# Patient Record
Sex: FEMALE | Race: WHITE | NOT HISPANIC OR LATINO | Employment: FULL TIME | URBAN - METROPOLITAN AREA
[De-identification: names, ages, dates, MRNs, and addresses within clinical notes are randomized per-mention and may not be internally consistent; named-entity substitution may affect disease eponyms.]

---

## 2021-04-21 ENCOUNTER — TRANSCRIBE ORDERS (OUTPATIENT)
Dept: LAB | Facility: HOSPITAL | Age: 31
End: 2021-04-21

## 2021-04-21 ENCOUNTER — LAB (OUTPATIENT)
Dept: LAB | Facility: HOSPITAL | Age: 31
End: 2021-04-21

## 2021-04-21 DIAGNOSIS — O02.1 MISSED ABORTION: Primary | ICD-10-CM

## 2021-04-21 DIAGNOSIS — O02.1 MISSED ABORTION: ICD-10-CM

## 2021-04-21 LAB
ABO GROUP BLD: NORMAL
BASOPHILS # BLD AUTO: 0.03 10*3/MM3 (ref 0–0.2)
BASOPHILS NFR BLD AUTO: 0.5 % (ref 0–1.5)
DEPRECATED RDW RBC AUTO: 40.9 FL (ref 37–54)
EOSINOPHIL # BLD AUTO: 0.01 10*3/MM3 (ref 0–0.4)
EOSINOPHIL NFR BLD AUTO: 0.2 % (ref 0.3–6.2)
ERYTHROCYTE [DISTWIDTH] IN BLOOD BY AUTOMATED COUNT: 12.6 % (ref 12.3–15.4)
HCT VFR BLD AUTO: 38.2 % (ref 34–46.6)
HGB BLD-MCNC: 12.7 G/DL (ref 12–15.9)
IMM GRANULOCYTES # BLD AUTO: 0.02 10*3/MM3 (ref 0–0.05)
IMM GRANULOCYTES NFR BLD AUTO: 0.3 % (ref 0–0.5)
LYMPHOCYTES # BLD AUTO: 1.61 10*3/MM3 (ref 0.7–3.1)
LYMPHOCYTES NFR BLD AUTO: 24.6 % (ref 19.6–45.3)
MCH RBC QN AUTO: 30.1 PG (ref 26.6–33)
MCHC RBC AUTO-ENTMCNC: 33.2 G/DL (ref 31.5–35.7)
MCV RBC AUTO: 90.5 FL (ref 79–97)
MONOCYTES # BLD AUTO: 0.36 10*3/MM3 (ref 0.1–0.9)
MONOCYTES NFR BLD AUTO: 5.5 % (ref 5–12)
NEUTROPHILS NFR BLD AUTO: 4.51 10*3/MM3 (ref 1.7–7)
NEUTROPHILS NFR BLD AUTO: 68.9 % (ref 42.7–76)
NRBC BLD AUTO-RTO: 0 /100 WBC (ref 0–0.2)
PLATELET # BLD AUTO: 328 10*3/MM3 (ref 140–450)
PMV BLD AUTO: 9.4 FL (ref 6–12)
RBC # BLD AUTO: 4.22 10*6/MM3 (ref 3.77–5.28)
RH BLD: POSITIVE
WBC # BLD AUTO: 6.54 10*3/MM3 (ref 3.4–10.8)

## 2021-04-21 PROCEDURE — 36415 COLL VENOUS BLD VENIPUNCTURE: CPT

## 2021-04-21 PROCEDURE — 86901 BLOOD TYPING SEROLOGIC RH(D): CPT

## 2021-04-21 PROCEDURE — 85025 COMPLETE CBC W/AUTO DIFF WBC: CPT

## 2021-04-21 PROCEDURE — 86900 BLOOD TYPING SEROLOGIC ABO: CPT

## 2021-04-23 ENCOUNTER — APPOINTMENT (OUTPATIENT)
Dept: PREADMISSION TESTING | Facility: HOSPITAL | Age: 31
End: 2021-04-23

## 2021-04-23 PROCEDURE — C9803 HOPD COVID-19 SPEC COLLECT: HCPCS

## 2021-04-23 PROCEDURE — U0004 COV-19 TEST NON-CDC HGH THRU: HCPCS

## 2021-04-24 LAB — SARS-COV-2 RNA NOSE QL NAA+PROBE: NOT DETECTED

## 2021-04-26 ENCOUNTER — LAB REQUISITION (OUTPATIENT)
Dept: LAB | Facility: HOSPITAL | Age: 31
End: 2021-04-26

## 2021-04-26 DIAGNOSIS — O02.1 MISSED ABORTION: ICD-10-CM

## 2021-04-26 PROCEDURE — 88305 TISSUE EXAM BY PATHOLOGIST: CPT | Performed by: OBSTETRICS & GYNECOLOGY

## 2021-04-27 LAB
CYTO UR: NORMAL
LAB AP CASE REPORT: NORMAL
LAB AP CLINICAL INFORMATION: NORMAL
PATH REPORT.FINAL DX SPEC: NORMAL
PATH REPORT.GROSS SPEC: NORMAL

## 2022-02-12 ENCOUNTER — TRANSCRIBE ORDERS (OUTPATIENT)
Dept: LAB | Facility: HOSPITAL | Age: 32
End: 2022-02-12

## 2022-02-12 ENCOUNTER — LAB (OUTPATIENT)
Dept: LAB | Facility: HOSPITAL | Age: 32
End: 2022-02-12

## 2022-02-12 DIAGNOSIS — N92.6 IRREGULAR MENSTRUAL CYCLE: Primary | ICD-10-CM

## 2022-02-12 DIAGNOSIS — N92.6 IRREGULAR MENSTRUAL CYCLE: ICD-10-CM

## 2022-02-12 LAB — HCG INTACT+B SERPL-ACNC: 1349 MIU/ML

## 2022-02-12 PROCEDURE — 36415 COLL VENOUS BLD VENIPUNCTURE: CPT

## 2022-02-12 PROCEDURE — 84702 CHORIONIC GONADOTROPIN TEST: CPT

## 2022-04-04 LAB
EXTERNAL HEPATITIS B SURFACE ANTIGEN: NEGATIVE
EXTERNAL HEPATITIS C AB: NORMAL
EXTERNAL RUBELLA QUALITATIVE: NORMAL
HIV1 P24 AG SERPL QL IA: NEGATIVE

## 2022-07-27 LAB
EXTERNAL GTT 1 HOUR: 158
EXTERNAL URINE DRUG SCREEN: NORMAL

## 2022-08-03 LAB — EXTERNAL GTT 3 HOURS: NORMAL

## 2022-09-14 LAB — EXTERNAL GROUP B STREP ANTIGEN: NORMAL

## 2022-10-23 ENCOUNTER — PREP FOR SURGERY (OUTPATIENT)
Dept: OTHER | Facility: HOSPITAL | Age: 32
End: 2022-10-23

## 2022-10-23 DIAGNOSIS — O48.0 POST TERM PREGNANCY AT 41 WEEKS GESTATION: Primary | ICD-10-CM

## 2022-10-23 DIAGNOSIS — Z3A.41 POST TERM PREGNANCY AT 41 WEEKS GESTATION: Primary | ICD-10-CM

## 2022-10-23 RX ORDER — SODIUM CHLORIDE, SODIUM LACTATE, POTASSIUM CHLORIDE, CALCIUM CHLORIDE 600; 310; 30; 20 MG/100ML; MG/100ML; MG/100ML; MG/100ML
125 INJECTION, SOLUTION INTRAVENOUS CONTINUOUS
Status: CANCELLED | OUTPATIENT
Start: 2022-10-23

## 2022-10-23 RX ORDER — ACETAMINOPHEN 325 MG/1
650 TABLET ORAL EVERY 4 HOURS PRN
Status: CANCELLED | OUTPATIENT
Start: 2022-10-23

## 2022-10-23 RX ORDER — TERBUTALINE SULFATE 1 MG/ML
0.25 INJECTION, SOLUTION SUBCUTANEOUS AS NEEDED
Status: CANCELLED | OUTPATIENT
Start: 2022-10-23

## 2022-10-23 RX ORDER — IBUPROFEN 600 MG/1
600 TABLET ORAL EVERY 6 HOURS PRN
Status: CANCELLED | OUTPATIENT
Start: 2022-10-23

## 2022-10-23 RX ORDER — PROMETHAZINE HYDROCHLORIDE 12.5 MG/1
12.5 TABLET ORAL EVERY 6 HOURS PRN
Status: CANCELLED | OUTPATIENT
Start: 2022-10-23

## 2022-10-23 RX ORDER — CARBOPROST TROMETHAMINE 250 UG/ML
250 INJECTION, SOLUTION INTRAMUSCULAR AS NEEDED
Status: CANCELLED | OUTPATIENT
Start: 2022-10-23

## 2022-10-23 RX ORDER — HYDROXYZINE HYDROCHLORIDE 25 MG/1
50 TABLET, FILM COATED ORAL NIGHTLY PRN
Status: CANCELLED | OUTPATIENT
Start: 2022-10-23

## 2022-10-23 RX ORDER — SODIUM CHLORIDE 0.9 % (FLUSH) 0.9 %
10 SYRINGE (ML) INJECTION AS NEEDED
Status: CANCELLED | OUTPATIENT
Start: 2022-10-23

## 2022-10-23 RX ORDER — BUTORPHANOL TARTRATE 1 MG/ML
1 INJECTION, SOLUTION INTRAMUSCULAR; INTRAVENOUS
Status: CANCELLED | OUTPATIENT
Start: 2022-10-23

## 2022-10-23 RX ORDER — LIDOCAINE HYDROCHLORIDE 10 MG/ML
5 INJECTION, SOLUTION EPIDURAL; INFILTRATION; INTRACAUDAL; PERINEURAL AS NEEDED
Status: CANCELLED | OUTPATIENT
Start: 2022-10-23

## 2022-10-23 RX ORDER — METHYLERGONOVINE MALEATE 0.2 MG/ML
200 INJECTION INTRAVENOUS ONCE AS NEEDED
Status: CANCELLED | OUTPATIENT
Start: 2022-10-23

## 2022-10-23 RX ORDER — ONDANSETRON 2 MG/ML
4 INJECTION INTRAMUSCULAR; INTRAVENOUS EVERY 6 HOURS PRN
Status: CANCELLED | OUTPATIENT
Start: 2022-10-23

## 2022-10-23 RX ORDER — SODIUM CHLORIDE 0.9 % (FLUSH) 0.9 %
10 SYRINGE (ML) INJECTION EVERY 12 HOURS SCHEDULED
Status: CANCELLED | OUTPATIENT
Start: 2022-10-23

## 2022-10-23 RX ORDER — PROMETHAZINE HYDROCHLORIDE 12.5 MG/1
12.5 SUPPOSITORY RECTAL EVERY 6 HOURS PRN
Status: CANCELLED | OUTPATIENT
Start: 2022-10-23

## 2022-10-23 RX ORDER — MISOPROSTOL 200 UG/1
800 TABLET ORAL AS NEEDED
Status: CANCELLED | OUTPATIENT
Start: 2022-10-23

## 2022-10-23 RX ORDER — MAGNESIUM CARB/ALUMINUM HYDROX 105-160MG
30 TABLET,CHEWABLE ORAL ONCE
Status: CANCELLED | OUTPATIENT
Start: 2022-10-23 | End: 2022-10-23

## 2022-10-23 RX ORDER — ONDANSETRON 4 MG/1
4 TABLET, FILM COATED ORAL EVERY 6 HOURS PRN
Status: CANCELLED | OUTPATIENT
Start: 2022-10-23

## 2022-10-23 RX ORDER — OXYTOCIN 10 [USP'U]/ML
10 INJECTION, SOLUTION INTRAMUSCULAR; INTRAVENOUS ONCE
Status: CANCELLED | OUTPATIENT
Start: 2022-10-23 | End: 2022-10-23

## 2022-10-24 ENCOUNTER — ANESTHESIA EVENT (OUTPATIENT)
Dept: LABOR AND DELIVERY | Facility: HOSPITAL | Age: 32
End: 2022-10-24

## 2022-10-24 ENCOUNTER — ANESTHESIA (OUTPATIENT)
Dept: LABOR AND DELIVERY | Facility: HOSPITAL | Age: 32
End: 2022-10-24

## 2022-10-24 ENCOUNTER — HOSPITAL ENCOUNTER (INPATIENT)
Facility: HOSPITAL | Age: 32
LOS: 3 days | Discharge: HOME | End: 2022-10-27
Attending: OBSTETRICS & GYNECOLOGY | Admitting: OBSTETRICS & GYNECOLOGY

## 2022-10-24 ENCOUNTER — HOSPITAL ENCOUNTER (OUTPATIENT)
Dept: LABOR AND DELIVERY | Facility: HOSPITAL | Age: 32
Discharge: HOME | End: 2022-10-24

## 2022-10-24 DIAGNOSIS — O48.0 POST TERM PREGNANCY AT 41 WEEKS GESTATION: ICD-10-CM

## 2022-10-24 DIAGNOSIS — Z3A.41 POST TERM PREGNANCY AT 41 WEEKS GESTATION: ICD-10-CM

## 2022-10-24 LAB
ABO GROUP BLD: NORMAL
ALP SERPL-CCNC: 119 U/L (ref 39–117)
ALT SERPL W P-5'-P-CCNC: 9 U/L (ref 1–33)
AST SERPL-CCNC: 29 U/L (ref 1–32)
BILIRUB SERPL-MCNC: <0.2 MG/DL (ref 0–1.2)
BLD GP AB SCN SERPL QL: NEGATIVE
CREAT SERPL-MCNC: 0.7 MG/DL (ref 0.57–1)
DEPRECATED RDW RBC AUTO: 46.5 FL (ref 37–54)
ERYTHROCYTE [DISTWIDTH] IN BLOOD BY AUTOMATED COUNT: 14.6 % (ref 12.3–15.4)
HCT VFR BLD AUTO: 34.2 % (ref 34–46.6)
HGB BLD-MCNC: 12 G/DL (ref 12–15.9)
LDH SERPL-CCNC: 267 U/L (ref 135–214)
MCH RBC QN AUTO: 30.5 PG (ref 26.6–33)
MCHC RBC AUTO-ENTMCNC: 35.1 G/DL (ref 31.5–35.7)
MCV RBC AUTO: 87 FL (ref 79–97)
PLATELET # BLD AUTO: 254 10*3/MM3 (ref 140–450)
PMV BLD AUTO: 10.8 FL (ref 6–12)
RBC # BLD AUTO: 3.93 10*6/MM3 (ref 3.77–5.28)
RH BLD: POSITIVE
T&S EXPIRATION DATE: NORMAL
URATE SERPL-MCNC: 5.9 MG/DL (ref 2.4–5.7)
WBC NRBC COR # BLD: 10.21 10*3/MM3 (ref 3.4–10.8)

## 2022-10-24 PROCEDURE — 3E0P7VZ INTRODUCTION OF HORMONE INTO FEMALE REPRODUCTIVE, VIA NATURAL OR ARTIFICIAL OPENING: ICD-10-PCS | Performed by: ADVANCED PRACTICE MIDWIFE

## 2022-10-24 PROCEDURE — C1755 CATHETER, INTRASPINAL: HCPCS

## 2022-10-24 PROCEDURE — 86850 RBC ANTIBODY SCREEN: CPT | Performed by: ADVANCED PRACTICE MIDWIFE

## 2022-10-24 PROCEDURE — 25010000002 FENTANYL CITRATE (PF) 50 MCG/ML SOLUTION: Performed by: NURSE ANESTHETIST, CERTIFIED REGISTERED

## 2022-10-24 PROCEDURE — 36415 COLL VENOUS BLD VENIPUNCTURE: CPT | Performed by: ADVANCED PRACTICE MIDWIFE

## 2022-10-24 PROCEDURE — 84550 ASSAY OF BLOOD/URIC ACID: CPT | Performed by: ADVANCED PRACTICE MIDWIFE

## 2022-10-24 PROCEDURE — 59025 FETAL NON-STRESS TEST: CPT

## 2022-10-24 PROCEDURE — 25010000002 ROPIVACAINE PER 1 MG: Performed by: NURSE ANESTHETIST, CERTIFIED REGISTERED

## 2022-10-24 PROCEDURE — C1755 CATHETER, INTRASPINAL: HCPCS | Performed by: ANESTHESIOLOGY

## 2022-10-24 PROCEDURE — 86900 BLOOD TYPING SEROLOGIC ABO: CPT | Performed by: ADVANCED PRACTICE MIDWIFE

## 2022-10-24 PROCEDURE — 84450 TRANSFERASE (AST) (SGOT): CPT | Performed by: ADVANCED PRACTICE MIDWIFE

## 2022-10-24 PROCEDURE — 86901 BLOOD TYPING SEROLOGIC RH(D): CPT | Performed by: ADVANCED PRACTICE MIDWIFE

## 2022-10-24 PROCEDURE — 84075 ASSAY ALKALINE PHOSPHATASE: CPT | Performed by: ADVANCED PRACTICE MIDWIFE

## 2022-10-24 PROCEDURE — 83615 LACTATE (LD) (LDH) ENZYME: CPT | Performed by: ADVANCED PRACTICE MIDWIFE

## 2022-10-24 PROCEDURE — 85027 COMPLETE CBC AUTOMATED: CPT | Performed by: ADVANCED PRACTICE MIDWIFE

## 2022-10-24 PROCEDURE — 84460 ALANINE AMINO (ALT) (SGPT): CPT | Performed by: ADVANCED PRACTICE MIDWIFE

## 2022-10-24 PROCEDURE — 82565 ASSAY OF CREATININE: CPT | Performed by: ADVANCED PRACTICE MIDWIFE

## 2022-10-24 PROCEDURE — 51703 INSERT BLADDER CATH COMPLEX: CPT

## 2022-10-24 PROCEDURE — 82247 BILIRUBIN TOTAL: CPT | Performed by: ADVANCED PRACTICE MIDWIFE

## 2022-10-24 RX ORDER — ACETAMINOPHEN 325 MG/1
650 TABLET ORAL EVERY 4 HOURS PRN
Status: DISCONTINUED | OUTPATIENT
Start: 2022-10-24 | End: 2022-10-25 | Stop reason: HOSPADM

## 2022-10-24 RX ORDER — BUTORPHANOL TARTRATE 1 MG/ML
1 INJECTION, SOLUTION INTRAMUSCULAR; INTRAVENOUS
Status: DISCONTINUED | OUTPATIENT
Start: 2022-10-24 | End: 2022-10-25

## 2022-10-24 RX ORDER — SODIUM CHLORIDE 0.9 % (FLUSH) 0.9 %
10 SYRINGE (ML) INJECTION AS NEEDED
Status: DISCONTINUED | OUTPATIENT
Start: 2022-10-24 | End: 2022-10-25 | Stop reason: HOSPADM

## 2022-10-24 RX ORDER — ESCITALOPRAM OXALATE 5 MG/1
5 TABLET ORAL DAILY
COMMUNITY

## 2022-10-24 RX ORDER — ONDANSETRON 4 MG/1
4 TABLET, FILM COATED ORAL EVERY 6 HOURS PRN
Status: DISCONTINUED | OUTPATIENT
Start: 2022-10-24 | End: 2022-10-25 | Stop reason: HOSPADM

## 2022-10-24 RX ORDER — ASPIRIN 81 MG/1
81 TABLET, CHEWABLE ORAL DAILY
COMMUNITY
End: 2022-10-27 | Stop reason: HOSPADM

## 2022-10-24 RX ORDER — TERBUTALINE SULFATE 1 MG/ML
0.25 INJECTION, SOLUTION SUBCUTANEOUS AS NEEDED
Status: DISCONTINUED | OUTPATIENT
Start: 2022-10-24 | End: 2022-10-25

## 2022-10-24 RX ORDER — CALCIUM CARBONATE 200(500)MG
1 TABLET,CHEWABLE ORAL DAILY
COMMUNITY

## 2022-10-24 RX ORDER — OXYTOCIN 10 [USP'U]/ML
10 INJECTION, SOLUTION INTRAMUSCULAR; INTRAVENOUS ONCE
Status: COMPLETED | OUTPATIENT
Start: 2022-10-24 | End: 2022-10-25

## 2022-10-24 RX ORDER — PROMETHAZINE HYDROCHLORIDE 12.5 MG/1
12.5 SUPPOSITORY RECTAL EVERY 6 HOURS PRN
Status: DISCONTINUED | OUTPATIENT
Start: 2022-10-24 | End: 2022-10-25 | Stop reason: HOSPADM

## 2022-10-24 RX ORDER — MISOPROSTOL 100 MCG
25 TABLET ORAL ONCE
Status: COMPLETED | OUTPATIENT
Start: 2022-10-24 | End: 2022-10-24

## 2022-10-24 RX ORDER — FENTANYL CITRATE 50 UG/ML
INJECTION, SOLUTION INTRAMUSCULAR; INTRAVENOUS AS NEEDED
Status: DISCONTINUED | OUTPATIENT
Start: 2022-10-24 | End: 2022-10-25 | Stop reason: SURG

## 2022-10-24 RX ORDER — ONDANSETRON 2 MG/ML
4 INJECTION INTRAMUSCULAR; INTRAVENOUS ONCE AS NEEDED
Status: DISCONTINUED | OUTPATIENT
Start: 2022-10-24 | End: 2022-10-25 | Stop reason: HOSPADM

## 2022-10-24 RX ORDER — TRISODIUM CITRATE DIHYDRATE AND CITRIC ACID MONOHYDRATE 500; 334 MG/5ML; MG/5ML
30 SOLUTION ORAL ONCE
Status: DISCONTINUED | OUTPATIENT
Start: 2022-10-24 | End: 2022-10-25

## 2022-10-24 RX ORDER — LIDOCAINE HYDROCHLORIDE AND EPINEPHRINE 15; 5 MG/ML; UG/ML
INJECTION, SOLUTION EPIDURAL AS NEEDED
Status: DISCONTINUED | OUTPATIENT
Start: 2022-10-24 | End: 2022-10-25 | Stop reason: SURG

## 2022-10-24 RX ORDER — MAGNESIUM CARB/ALUMINUM HYDROX 105-160MG
30 TABLET,CHEWABLE ORAL ONCE
Status: DISCONTINUED | OUTPATIENT
Start: 2022-10-24 | End: 2022-10-25

## 2022-10-24 RX ORDER — DIPHENHYDRAMINE HYDROCHLORIDE 50 MG/ML
12.5 INJECTION INTRAMUSCULAR; INTRAVENOUS EVERY 8 HOURS PRN
Status: DISCONTINUED | OUTPATIENT
Start: 2022-10-24 | End: 2022-10-25 | Stop reason: HOSPADM

## 2022-10-24 RX ORDER — PROMETHAZINE HYDROCHLORIDE 12.5 MG/1
12.5 TABLET ORAL EVERY 6 HOURS PRN
Status: DISCONTINUED | OUTPATIENT
Start: 2022-10-24 | End: 2022-10-25 | Stop reason: HOSPADM

## 2022-10-24 RX ORDER — HYDROXYZINE HYDROCHLORIDE 25 MG/1
50 TABLET, FILM COATED ORAL NIGHTLY PRN
Status: DISCONTINUED | OUTPATIENT
Start: 2022-10-24 | End: 2022-10-25 | Stop reason: HOSPADM

## 2022-10-24 RX ORDER — ONDANSETRON 2 MG/ML
4 INJECTION INTRAMUSCULAR; INTRAVENOUS EVERY 6 HOURS PRN
Status: DISCONTINUED | OUTPATIENT
Start: 2022-10-24 | End: 2022-10-25 | Stop reason: HOSPADM

## 2022-10-24 RX ORDER — EPHEDRINE SULFATE 5 MG/ML
INJECTION INTRAVENOUS
Status: DISCONTINUED
Start: 2022-10-24 | End: 2022-10-25 | Stop reason: WASHOUT

## 2022-10-24 RX ORDER — EPHEDRINE SULFATE 5 MG/ML
10 INJECTION INTRAVENOUS
Status: DISCONTINUED | OUTPATIENT
Start: 2022-10-24 | End: 2022-10-25

## 2022-10-24 RX ORDER — FAMOTIDINE 10 MG/ML
20 INJECTION, SOLUTION INTRAVENOUS ONCE AS NEEDED
Status: DISCONTINUED | OUTPATIENT
Start: 2022-10-24 | End: 2022-10-25 | Stop reason: HOSPADM

## 2022-10-24 RX ORDER — SODIUM CHLORIDE, SODIUM LACTATE, POTASSIUM CHLORIDE, CALCIUM CHLORIDE 600; 310; 30; 20 MG/100ML; MG/100ML; MG/100ML; MG/100ML
125 INJECTION, SOLUTION INTRAVENOUS CONTINUOUS
Status: DISCONTINUED | OUTPATIENT
Start: 2022-10-24 | End: 2022-10-27 | Stop reason: HOSPADM

## 2022-10-24 RX ORDER — MISOPROSTOL 100 MCG
25 TABLET ORAL
Status: COMPLETED | OUTPATIENT
Start: 2022-10-24 | End: 2022-10-24

## 2022-10-24 RX ORDER — SODIUM CHLORIDE 0.9 % (FLUSH) 0.9 %
10 SYRINGE (ML) INJECTION EVERY 12 HOURS SCHEDULED
Status: DISCONTINUED | OUTPATIENT
Start: 2022-10-24 | End: 2022-10-25 | Stop reason: HOSPADM

## 2022-10-24 RX ORDER — ROPIVACAINE HYDROCHLORIDE 2 MG/ML
15 INJECTION, SOLUTION EPIDURAL; INFILTRATION; PERINEURAL CONTINUOUS
Status: DISCONTINUED | OUTPATIENT
Start: 2022-10-24 | End: 2022-10-25

## 2022-10-24 RX ORDER — LIDOCAINE HYDROCHLORIDE 10 MG/ML
5 INJECTION, SOLUTION EPIDURAL; INFILTRATION; INTRACAUDAL; PERINEURAL AS NEEDED
Status: DISCONTINUED | OUTPATIENT
Start: 2022-10-24 | End: 2022-10-25 | Stop reason: HOSPADM

## 2022-10-24 RX ADMIN — ROPIVACAINE HYDROCHLORIDE 15 ML/HR: 2 INJECTION, SOLUTION EPIDURAL; INFILTRATION at 12:10

## 2022-10-24 RX ADMIN — SODIUM CHLORIDE, POTASSIUM CHLORIDE, SODIUM LACTATE AND CALCIUM CHLORIDE 1000 ML: 600; 310; 30; 20 INJECTION, SOLUTION INTRAVENOUS at 11:49

## 2022-10-24 RX ADMIN — ACETAMINOPHEN 650 MG: 325 TABLET, FILM COATED ORAL at 16:19

## 2022-10-24 RX ADMIN — SODIUM CHLORIDE, POTASSIUM CHLORIDE, SODIUM LACTATE AND CALCIUM CHLORIDE 125 ML/HR: 600; 310; 30; 20 INJECTION, SOLUTION INTRAVENOUS at 20:54

## 2022-10-24 RX ADMIN — ROPIVACAINE HYDROCHLORIDE 15 ML/HR: 2 INJECTION, SOLUTION EPIDURAL; INFILTRATION at 21:51

## 2022-10-24 RX ADMIN — LIDOCAINE HYDROCHLORIDE AND EPINEPHRINE 3 ML: 15; 5 INJECTION, SOLUTION EPIDURAL at 12:02

## 2022-10-24 RX ADMIN — ROPIVACAINE HYDROCHLORIDE 10 ML: 5 INJECTION, SOLUTION EPIDURAL; INFILTRATION; PERINEURAL at 12:08

## 2022-10-24 RX ADMIN — MISOPROSTOL 25 MCG: 100 TABLET ORAL at 19:25

## 2022-10-24 RX ADMIN — SODIUM CHLORIDE, POTASSIUM CHLORIDE, SODIUM LACTATE AND CALCIUM CHLORIDE 125 ML/HR: 600; 310; 30; 20 INJECTION, SOLUTION INTRAVENOUS at 00:57

## 2022-10-24 RX ADMIN — SODIUM CHLORIDE, POTASSIUM CHLORIDE, SODIUM LACTATE AND CALCIUM CHLORIDE 125 ML/HR: 600; 310; 30; 20 INJECTION, SOLUTION INTRAVENOUS at 13:18

## 2022-10-24 RX ADMIN — SODIUM CHLORIDE, POTASSIUM CHLORIDE, SODIUM LACTATE AND CALCIUM CHLORIDE 125 ML/HR: 600; 310; 30; 20 INJECTION, SOLUTION INTRAVENOUS at 07:18

## 2022-10-24 RX ADMIN — FENTANYL CITRATE 100 MCG: 50 INJECTION, SOLUTION INTRAMUSCULAR; INTRAVENOUS at 12:05

## 2022-10-24 RX ADMIN — MISOPROSTOL 25 MCG: 100 TABLET ORAL at 23:47

## 2022-10-24 RX ADMIN — MISOPROSTOL 25 MCG: 100 TABLET ORAL at 14:42

## 2022-10-24 RX ADMIN — SODIUM CHLORIDE, POTASSIUM CHLORIDE, SODIUM LACTATE AND CALCIUM CHLORIDE 400 ML/HR: 600; 310; 30; 20 INJECTION, SOLUTION INTRAVENOUS at 12:10

## 2022-10-24 NOTE — ANESTHESIA PREPROCEDURE EVALUATION
Anesthesia Evaluation     Patient summary reviewed and Nursing notes reviewed   NPO Solid Status: > 6 hours  NPO Liquid Status: > 6 hours           Airway   Dental      Pulmonary - negative pulmonary ROS   Cardiovascular - negative cardio ROS        Neuro/Psych  (+) psychiatric history Anxiety and Depression,    GI/Hepatic/Renal/Endo - negative ROS     Musculoskeletal (-) negative ROS    Abdominal    Substance History - negative use     OB/GYN    (+) Pregnant,         Other                        Anesthesia Plan    ASA 2     epidural       Anesthetic plan, risks, benefits, and alternatives have been provided, discussed and informed consent has been obtained with: patient.        CODE STATUS:    Code Status (Patient has no pulse and is not breathing): CPR (Attempt to Resuscitate)  Medical Interventions (Patient has pulse or is breathing): Full

## 2022-10-24 NOTE — ANESTHESIA PROCEDURE NOTES
Labor Epidural      Patient reassessed immediately prior to procedure    Patient location during procedure: OB  Performed By  CRNA/CAA: Carmel Garduno CRNA  Preanesthetic Checklist  Completed: patient identified, IV checked, risks and benefits discussed, surgical consent, monitors and equipment checked, pre-op evaluation and timeout performed  Prep:  Pt Position:sitting  Sterile Tech:cap, gloves, mask and sterile barrier  Prep:DuraPrep  Monitoring:blood pressure monitoring  Epidural Block Procedure:  Approach:midline  Guidance:palpation technique  Location:L3-L4  Needle Type:Tuohy  Needle Gauge:17 G  Loss of Resistance Medium: saline  Loss of Resistance: 7cm  Cath Depth at skin:12 cm  Paresthesia: none  Aspiration:negative  Test Dose:negative  Number of Attempts: 1  Post Assessment:  Dressing:occlusive dressing applied and secured with tape  Pt Tolerance:patient tolerated the procedure well with no apparent complications  Complications:no

## 2022-10-25 PROBLEM — Z3A.41 41 WEEKS GESTATION OF PREGNANCY: Status: RESOLVED | Noted: 2022-10-24 | Resolved: 2022-10-25

## 2022-10-25 PROBLEM — O48.0 41 WEEKS GESTATION OF PREGNANCY: Status: RESOLVED | Noted: 2022-10-24 | Resolved: 2022-10-25

## 2022-10-25 PROCEDURE — 0DQR0ZZ REPAIR ANAL SPHINCTER, OPEN APPROACH: ICD-10-PCS | Performed by: ADVANCED PRACTICE MIDWIFE

## 2022-10-25 PROCEDURE — 59025 FETAL NON-STRESS TEST: CPT

## 2022-10-25 PROCEDURE — 25010000002 OXYTOCIN PER 10 UNITS: Performed by: ADVANCED PRACTICE MIDWIFE

## 2022-10-25 RX ORDER — IBUPROFEN 600 MG/1
600 TABLET ORAL EVERY 6 HOURS PRN
Status: DISCONTINUED | OUTPATIENT
Start: 2022-10-25 | End: 2022-10-27 | Stop reason: HOSPADM

## 2022-10-25 RX ORDER — HYDROCODONE BITARTRATE AND ACETAMINOPHEN 7.5; 325 MG/1; MG/1
1 TABLET ORAL EVERY 4 HOURS PRN
Status: DISCONTINUED | OUTPATIENT
Start: 2022-10-25 | End: 2022-10-27 | Stop reason: HOSPADM

## 2022-10-25 RX ORDER — IBUPROFEN 600 MG/1
600 TABLET ORAL EVERY 6 HOURS PRN
Status: DISCONTINUED | OUTPATIENT
Start: 2022-10-25 | End: 2022-10-25 | Stop reason: SDUPTHER

## 2022-10-25 RX ORDER — BISACODYL 10 MG
10 SUPPOSITORY, RECTAL RECTAL DAILY PRN
Status: DISCONTINUED | OUTPATIENT
Start: 2022-10-26 | End: 2022-10-27 | Stop reason: HOSPADM

## 2022-10-25 RX ORDER — HYDROCORTISONE 25 MG/G
1 CREAM TOPICAL AS NEEDED
Status: DISCONTINUED | OUTPATIENT
Start: 2022-10-25 | End: 2022-10-27 | Stop reason: HOSPADM

## 2022-10-25 RX ORDER — METHYLERGONOVINE MALEATE 0.2 MG/ML
200 INJECTION INTRAVENOUS ONCE AS NEEDED
Status: DISCONTINUED | OUTPATIENT
Start: 2022-10-25 | End: 2022-10-25 | Stop reason: HOSPADM

## 2022-10-25 RX ORDER — ONDANSETRON 2 MG/ML
4 INJECTION INTRAMUSCULAR; INTRAVENOUS EVERY 6 HOURS PRN
Status: DISCONTINUED | OUTPATIENT
Start: 2022-10-25 | End: 2022-10-27 | Stop reason: HOSPADM

## 2022-10-25 RX ORDER — MISOPROSTOL 200 UG/1
800 TABLET ORAL AS NEEDED
Status: DISCONTINUED | OUTPATIENT
Start: 2022-10-25 | End: 2022-10-25 | Stop reason: HOSPADM

## 2022-10-25 RX ORDER — DOCUSATE SODIUM 100 MG/1
100 CAPSULE, LIQUID FILLED ORAL 2 TIMES DAILY
Status: DISCONTINUED | OUTPATIENT
Start: 2022-10-25 | End: 2022-10-27 | Stop reason: HOSPADM

## 2022-10-25 RX ORDER — CARBOPROST TROMETHAMINE 250 UG/ML
250 INJECTION, SOLUTION INTRAMUSCULAR AS NEEDED
Status: DISCONTINUED | OUTPATIENT
Start: 2022-10-25 | End: 2022-10-25 | Stop reason: HOSPADM

## 2022-10-25 RX ORDER — SODIUM CHLORIDE 0.9 % (FLUSH) 0.9 %
1-10 SYRINGE (ML) INJECTION AS NEEDED
Status: DISCONTINUED | OUTPATIENT
Start: 2022-10-25 | End: 2022-10-27 | Stop reason: HOSPADM

## 2022-10-25 RX ORDER — ACETAMINOPHEN 325 MG/1
650 TABLET ORAL EVERY 4 HOURS PRN
Status: DISCONTINUED | OUTPATIENT
Start: 2022-10-25 | End: 2022-10-27 | Stop reason: HOSPADM

## 2022-10-25 RX ADMIN — IBUPROFEN 600 MG: 600 TABLET ORAL at 20:08

## 2022-10-25 RX ADMIN — IBUPROFEN 600 MG: 600 TABLET ORAL at 11:41

## 2022-10-25 RX ADMIN — OXYTOCIN 10 UNITS: 10 INJECTION INTRAVENOUS at 03:59

## 2022-10-25 RX ADMIN — IBUPROFEN 600 MG: 600 TABLET ORAL at 06:32

## 2022-10-25 RX ADMIN — DOCUSATE SODIUM 100 MG: 100 CAPSULE, LIQUID FILLED ORAL at 08:07

## 2022-10-25 RX ADMIN — ACETAMINOPHEN 650 MG: 325 TABLET, FILM COATED ORAL at 18:18

## 2022-10-25 RX ADMIN — ACETAMINOPHEN 650 MG: 325 TABLET, FILM COATED ORAL at 08:07

## 2022-10-25 RX ADMIN — ACETAMINOPHEN 650 MG: 325 TABLET, FILM COATED ORAL at 13:31

## 2022-10-25 RX ADMIN — DOCUSATE SODIUM 100 MG: 100 CAPSULE, LIQUID FILLED ORAL at 20:08

## 2022-10-25 NOTE — ANESTHESIA POSTPROCEDURE EVALUATION
Patient: Nieves Erickson    Procedure Summary     Date: 10/24/22 Room / Location:     Anesthesia Start: 1152 Anesthesia Stop: 10/25/22 0407    Procedure: LABOR ANALGESIA Diagnosis:     Scheduled Providers:  Provider: Tasha Gaytan DO    Anesthesia Type: epidural ASA Status: 2          Anesthesia Type: epidural    Vitals  Vitals Value Taken Time   /66 10/25/22 0836   Temp 98.4 °F (36.9 °C) 10/25/22 0836   Pulse 72 10/25/22 0836   Resp 18 10/25/22 0836   SpO2             Post Anesthesia Care and Evaluation    Patient location during evaluation: bedside  Patient participation: complete - patient participated  Level of consciousness: awake and alert  Pain management: adequate    Airway patency: patent  Anesthetic complications: No anesthetic complications    Cardiovascular status: acceptable  Respiratory status: acceptable  Hydration status: acceptable  Post Neuraxial Block status: Motor and sensory function returned to baseline and No signs or symptoms of PDPH

## 2022-10-26 LAB
BASOPHILS # BLD AUTO: 0.04 10*3/MM3 (ref 0–0.2)
BASOPHILS NFR BLD AUTO: 0.3 % (ref 0–1.5)
DEPRECATED RDW RBC AUTO: 50.1 FL (ref 37–54)
EOSINOPHIL # BLD AUTO: 0.09 10*3/MM3 (ref 0–0.4)
EOSINOPHIL NFR BLD AUTO: 0.8 % (ref 0.3–6.2)
ERYTHROCYTE [DISTWIDTH] IN BLOOD BY AUTOMATED COUNT: 15 % (ref 12.3–15.4)
HCT VFR BLD AUTO: 33.1 % (ref 34–46.6)
HGB BLD-MCNC: 10.8 G/DL (ref 12–15.9)
IMM GRANULOCYTES # BLD AUTO: 0.15 10*3/MM3 (ref 0–0.05)
IMM GRANULOCYTES NFR BLD AUTO: 1.3 % (ref 0–0.5)
LYMPHOCYTES # BLD AUTO: 2.1 10*3/MM3 (ref 0.7–3.1)
LYMPHOCYTES NFR BLD AUTO: 18.2 % (ref 19.6–45.3)
MCH RBC QN AUTO: 30 PG (ref 26.6–33)
MCHC RBC AUTO-ENTMCNC: 32.6 G/DL (ref 31.5–35.7)
MCV RBC AUTO: 91.9 FL (ref 79–97)
MONOCYTES # BLD AUTO: 0.57 10*3/MM3 (ref 0.1–0.9)
MONOCYTES NFR BLD AUTO: 5 % (ref 5–12)
NEUTROPHILS NFR BLD AUTO: 74.4 % (ref 42.7–76)
NEUTROPHILS NFR BLD AUTO: 8.56 10*3/MM3 (ref 1.7–7)
NRBC BLD AUTO-RTO: 0 /100 WBC (ref 0–0.2)
PLATELET # BLD AUTO: 232 10*3/MM3 (ref 140–450)
PMV BLD AUTO: 10.3 FL (ref 6–12)
RBC # BLD AUTO: 3.6 10*6/MM3 (ref 3.77–5.28)
WBC NRBC COR # BLD: 11.51 10*3/MM3 (ref 3.4–10.8)

## 2022-10-26 PROCEDURE — 85025 COMPLETE CBC W/AUTO DIFF WBC: CPT | Performed by: ADVANCED PRACTICE MIDWIFE

## 2022-10-26 RX ADMIN — IBUPROFEN 600 MG: 600 TABLET ORAL at 04:01

## 2022-10-26 RX ADMIN — DOCUSATE SODIUM 100 MG: 100 CAPSULE, LIQUID FILLED ORAL at 20:29

## 2022-10-26 RX ADMIN — IBUPROFEN 600 MG: 600 TABLET ORAL at 17:35

## 2022-10-26 RX ADMIN — DOCUSATE SODIUM 100 MG: 100 CAPSULE, LIQUID FILLED ORAL at 10:01

## 2022-10-27 VITALS
RESPIRATION RATE: 20 BRPM | TEMPERATURE: 98.4 F | HEIGHT: 63 IN | HEART RATE: 65 BPM | BODY MASS INDEX: 31.89 KG/M2 | WEIGHT: 180 LBS | DIASTOLIC BLOOD PRESSURE: 72 MMHG | SYSTOLIC BLOOD PRESSURE: 125 MMHG

## 2022-10-27 PROCEDURE — 90707 MMR VACCINE SC: CPT | Performed by: ADVANCED PRACTICE MIDWIFE

## 2022-10-27 PROCEDURE — 90471 IMMUNIZATION ADMIN: CPT | Performed by: ADVANCED PRACTICE MIDWIFE

## 2022-10-27 PROCEDURE — 25010000002 MEASLES, MUMPS & RUBELLA VAC RECONSTITUTED SOLUTION: Performed by: ADVANCED PRACTICE MIDWIFE

## 2022-10-27 RX ORDER — IBUPROFEN 600 MG/1
600 TABLET ORAL EVERY 6 HOURS PRN
Qty: 60 TABLET | Refills: 1 | Status: SHIPPED | OUTPATIENT
Start: 2022-10-27

## 2022-10-27 RX ORDER — PSEUDOEPHEDRINE HCL 30 MG
100 TABLET ORAL 2 TIMES DAILY
Qty: 60 CAPSULE | Refills: 1 | Status: SHIPPED | OUTPATIENT
Start: 2022-10-27

## 2022-10-27 RX ADMIN — MEASLES, MUMPS, AND RUBELLA VIRUS VACCINE LIVE 0.5 ML: 1000; 12500; 1000 INJECTION, POWDER, LYOPHILIZED, FOR SUSPENSION SUBCUTANEOUS at 12:21

## 2022-10-27 RX ADMIN — IBUPROFEN 600 MG: 600 TABLET ORAL at 06:15

## 2022-10-27 RX ADMIN — DOCUSATE SODIUM 100 MG: 100 CAPSULE, LIQUID FILLED ORAL at 09:12

## 2024-03-27 ENCOUNTER — TRANSCRIBE ORDERS (OUTPATIENT)
Dept: LAB | Facility: HOSPITAL | Age: 34
End: 2024-03-27
Payer: COMMERCIAL

## 2024-03-27 ENCOUNTER — LAB (OUTPATIENT)
Dept: LAB | Facility: HOSPITAL | Age: 34
End: 2024-03-27
Payer: COMMERCIAL

## 2024-03-27 DIAGNOSIS — Z32.01 PREGNANCY EXAMINATION OR TEST, POSITIVE RESULT: ICD-10-CM

## 2024-03-27 DIAGNOSIS — Z32.01 PREGNANCY EXAMINATION OR TEST, POSITIVE RESULT: Primary | ICD-10-CM

## 2024-03-27 LAB — HCG INTACT+B SERPL-ACNC: 718 MIU/ML

## 2024-03-27 PROCEDURE — 84702 CHORIONIC GONADOTROPIN TEST: CPT

## 2024-03-27 PROCEDURE — 84144 ASSAY OF PROGESTERONE: CPT

## 2024-03-27 PROCEDURE — 36415 COLL VENOUS BLD VENIPUNCTURE: CPT

## 2024-03-28 LAB — PROGEST SERPL-MCNC: 13.3 NG/ML

## 2024-03-29 ENCOUNTER — TRANSCRIBE ORDERS (OUTPATIENT)
Dept: LAB | Facility: HOSPITAL | Age: 34
End: 2024-03-29
Payer: COMMERCIAL

## 2024-03-29 ENCOUNTER — LAB (OUTPATIENT)
Dept: LAB | Facility: HOSPITAL | Age: 34
End: 2024-03-29
Payer: COMMERCIAL

## 2024-03-29 DIAGNOSIS — Z32.01 PREGNANCY EXAMINATION OR TEST, POSITIVE RESULT: Primary | ICD-10-CM

## 2024-03-29 DIAGNOSIS — Z32.01 PREGNANCY EXAMINATION OR TEST, POSITIVE RESULT: ICD-10-CM

## 2024-03-29 LAB — HCG INTACT+B SERPL-ACNC: 1786 MIU/ML

## 2024-03-29 PROCEDURE — 84702 CHORIONIC GONADOTROPIN TEST: CPT

## 2024-03-29 PROCEDURE — 36415 COLL VENOUS BLD VENIPUNCTURE: CPT

## 2024-04-30 ENCOUNTER — LAB (OUTPATIENT)
Facility: HOSPITAL | Age: 34
End: 2024-04-30
Payer: COMMERCIAL

## 2024-04-30 ENCOUNTER — TRANSCRIBE ORDERS (OUTPATIENT)
Facility: HOSPITAL | Age: 34
End: 2024-04-30
Payer: COMMERCIAL

## 2024-04-30 DIAGNOSIS — Z34.81 ENCOUNTER FOR SUPERVISION OF OTHER NORMAL PREGNANCY, FIRST TRIMESTER: Primary | ICD-10-CM

## 2024-04-30 DIAGNOSIS — Z34.81 ENCOUNTER FOR SUPERVISION OF OTHER NORMAL PREGNANCY, FIRST TRIMESTER: ICD-10-CM

## 2024-04-30 LAB
ABO GROUP BLD: NORMAL
AMPHET+METHAMPHET UR QL: NEGATIVE
AMPHETAMINES UR QL: NEGATIVE
BARBITURATES UR QL SCN: NEGATIVE
BENZODIAZ UR QL SCN: NEGATIVE
BLD GP AB SCN SERPL QL: NEGATIVE
BUPRENORPHINE SERPL-MCNC: NEGATIVE NG/ML
CANNABINOIDS SERPL QL: NEGATIVE
COCAINE UR QL: NEGATIVE
DEPRECATED RDW RBC AUTO: 43.7 FL (ref 37–54)
ERYTHROCYTE [DISTWIDTH] IN BLOOD BY AUTOMATED COUNT: 13.6 % (ref 12.3–15.4)
FENTANYL UR-MCNC: NEGATIVE NG/ML
HCT VFR BLD AUTO: 40.3 % (ref 34–46.6)
HGB BLD-MCNC: 13.2 G/DL (ref 12–15.9)
MCH RBC QN AUTO: 28.7 PG (ref 26.6–33)
MCHC RBC AUTO-ENTMCNC: 32.8 G/DL (ref 31.5–35.7)
MCV RBC AUTO: 87.6 FL (ref 79–97)
METHADONE UR QL SCN: NEGATIVE
OPIATES UR QL: NEGATIVE
OXYCODONE UR QL SCN: NEGATIVE
PCP UR QL SCN: NEGATIVE
PLATELET # BLD AUTO: 426 10*3/MM3 (ref 140–450)
PMV BLD AUTO: 10 FL (ref 6–12)
RBC # BLD AUTO: 4.6 10*6/MM3 (ref 3.77–5.28)
RH BLD: POSITIVE
TRICYCLICS UR QL SCN: NEGATIVE
WBC NRBC COR # BLD AUTO: 7.36 10*3/MM3 (ref 3.4–10.8)

## 2024-04-30 PROCEDURE — 81003 URINALYSIS AUTO W/O SCOPE: CPT

## 2024-04-30 PROCEDURE — 86850 RBC ANTIBODY SCREEN: CPT

## 2024-04-30 PROCEDURE — 87340 HEPATITIS B SURFACE AG IA: CPT

## 2024-04-30 PROCEDURE — 85027 COMPLETE CBC AUTOMATED: CPT

## 2024-04-30 PROCEDURE — 87491 CHLMYD TRACH DNA AMP PROBE: CPT

## 2024-04-30 PROCEDURE — 86762 RUBELLA ANTIBODY: CPT

## 2024-04-30 PROCEDURE — 87591 N.GONORRHOEAE DNA AMP PROB: CPT

## 2024-04-30 PROCEDURE — 86901 BLOOD TYPING SEROLOGIC RH(D): CPT

## 2024-04-30 PROCEDURE — 87086 URINE CULTURE/COLONY COUNT: CPT

## 2024-04-30 PROCEDURE — 36415 COLL VENOUS BLD VENIPUNCTURE: CPT

## 2024-04-30 PROCEDURE — 86803 HEPATITIS C AB TEST: CPT

## 2024-04-30 PROCEDURE — 80307 DRUG TEST PRSMV CHEM ANLYZR: CPT

## 2024-04-30 PROCEDURE — G0432 EIA HIV-1/HIV-2 SCREEN: HCPCS

## 2024-04-30 PROCEDURE — 86780 TREPONEMA PALLIDUM: CPT

## 2024-04-30 PROCEDURE — 86900 BLOOD TYPING SEROLOGIC ABO: CPT

## 2024-05-01 LAB
BILIRUB UR QL STRIP: NEGATIVE
CLARITY UR: CLEAR
COLOR UR: YELLOW
GLUCOSE UR STRIP-MCNC: NEGATIVE MG/DL
HBV SURFACE AG SERPL QL IA: NORMAL
HCV AB SER QL: NORMAL
HGB UR QL STRIP.AUTO: NEGATIVE
HIV 1+2 AB+HIV1 P24 AG SERPL QL IA: NORMAL
KETONES UR QL STRIP: NEGATIVE
LEUKOCYTE ESTERASE UR QL STRIP.AUTO: NEGATIVE
NITRITE UR QL STRIP: NEGATIVE
PH UR STRIP.AUTO: 7 [PH] (ref 5–8)
PROT UR QL STRIP: NEGATIVE
SP GR UR STRIP: 1.02 (ref 1–1.03)
UROBILINOGEN UR QL STRIP: NORMAL

## 2024-05-02 LAB
BACTERIA SPEC AEROBE CULT: NO GROWTH
RUBV IGG SERPL IA-ACNC: 2.97 INDEX
TREPONEMA PALLIDUM IGG+IGM AB [PRESENCE] IN SERUM OR PLASMA BY IMMUNOASSAY: NON REACTIVE

## 2024-05-04 LAB
C TRACH RRNA SPEC QL NAA+PROBE: NEGATIVE
N GONORRHOEA RRNA SPEC QL NAA+PROBE: NEGATIVE

## 2024-08-06 ENCOUNTER — TRANSCRIBE ORDERS (OUTPATIENT)
Facility: HOSPITAL | Age: 34
End: 2024-08-06
Payer: COMMERCIAL

## 2024-08-06 ENCOUNTER — LAB (OUTPATIENT)
Facility: HOSPITAL | Age: 34
End: 2024-08-06
Payer: COMMERCIAL

## 2024-08-06 DIAGNOSIS — Z34.82 PRENATAL CARE, SUBSEQUENT PREGNANCY, SECOND TRIMESTER: ICD-10-CM

## 2024-08-06 DIAGNOSIS — Z34.82 PRENATAL CARE, SUBSEQUENT PREGNANCY, SECOND TRIMESTER: Primary | ICD-10-CM

## 2024-08-06 LAB
BLD GP AB SCN SERPL QL: NEGATIVE
GLUCOSE 1H P 100 G GLC PO SERPL-MCNC: 81 MG/DL (ref 65–139)
TREPONEMA PALLIDUM IGG+IGM AB [PRESENCE] IN SERUM OR PLASMA BY IMMUNOASSAY: NORMAL

## 2024-08-06 PROCEDURE — 82950 GLUCOSE TEST: CPT

## 2024-08-06 PROCEDURE — 85027 COMPLETE CBC AUTOMATED: CPT

## 2024-08-06 PROCEDURE — 86780 TREPONEMA PALLIDUM: CPT

## 2024-08-06 PROCEDURE — 36415 COLL VENOUS BLD VENIPUNCTURE: CPT

## 2024-08-06 PROCEDURE — 86850 RBC ANTIBODY SCREEN: CPT

## 2024-08-07 LAB
DEPRECATED RDW RBC AUTO: 44.6 FL (ref 37–54)
ERYTHROCYTE [DISTWIDTH] IN BLOOD BY AUTOMATED COUNT: 13.6 % (ref 12.3–15.4)
HCT VFR BLD AUTO: 34.5 % (ref 34–46.6)
HGB BLD-MCNC: 11.3 G/DL (ref 12–15.9)
MCH RBC QN AUTO: 29.4 PG (ref 26.6–33)
MCHC RBC AUTO-ENTMCNC: 32.8 G/DL (ref 31.5–35.7)
MCV RBC AUTO: 89.8 FL (ref 79–97)
PLATELET # BLD AUTO: 361 10*3/MM3 (ref 140–450)
PMV BLD AUTO: 9.4 FL (ref 6–12)
RBC # BLD AUTO: 3.84 10*6/MM3 (ref 3.77–5.28)
WBC NRBC COR # BLD AUTO: 8.29 10*3/MM3 (ref 3.4–10.8)

## 2024-10-31 LAB — EXTERNAL GROUP B STREP ANTIGEN: NEGATIVE

## 2024-11-20 ENCOUNTER — PREP FOR SURGERY (OUTPATIENT)
Dept: OTHER | Facility: HOSPITAL | Age: 34
End: 2024-11-20
Payer: COMMERCIAL

## 2024-11-20 DIAGNOSIS — Z34.90 TERM PREGNANCY: Primary | ICD-10-CM

## 2024-11-20 RX ORDER — ACETAMINOPHEN 325 MG/1
650 TABLET ORAL EVERY 4 HOURS PRN
Status: CANCELLED | OUTPATIENT
Start: 2024-11-20

## 2024-11-20 RX ORDER — SODIUM CHLORIDE 9 MG/ML
40 INJECTION, SOLUTION INTRAVENOUS AS NEEDED
Status: CANCELLED | OUTPATIENT
Start: 2024-11-20

## 2024-11-20 RX ORDER — SODIUM CHLORIDE, SODIUM LACTATE, POTASSIUM CHLORIDE, CALCIUM CHLORIDE 600; 310; 30; 20 MG/100ML; MG/100ML; MG/100ML; MG/100ML
75 INJECTION, SOLUTION INTRAVENOUS CONTINUOUS
Status: CANCELLED | OUTPATIENT
Start: 2024-11-20 | End: 2024-11-22

## 2024-11-20 RX ORDER — CARBOPROST TROMETHAMINE 250 UG/ML
250 INJECTION, SOLUTION INTRAMUSCULAR AS NEEDED
Status: CANCELLED | OUTPATIENT
Start: 2024-11-20

## 2024-11-20 RX ORDER — FENTANYL CITRATE 50 UG/ML
50 INJECTION, SOLUTION INTRAMUSCULAR; INTRAVENOUS
Status: CANCELLED | OUTPATIENT
Start: 2024-11-20 | End: 2024-11-27

## 2024-11-20 RX ORDER — ONDANSETRON 4 MG/1
4 TABLET, ORALLY DISINTEGRATING ORAL EVERY 6 HOURS PRN
Status: CANCELLED | OUTPATIENT
Start: 2024-11-20

## 2024-11-20 RX ORDER — MISOPROSTOL 200 UG/1
800 TABLET ORAL AS NEEDED
Status: CANCELLED | OUTPATIENT
Start: 2024-11-20

## 2024-11-20 RX ORDER — LIDOCAINE HYDROCHLORIDE 10 MG/ML
0.5 INJECTION, SOLUTION EPIDURAL; INFILTRATION; INTRACAUDAL; PERINEURAL ONCE AS NEEDED
Status: CANCELLED | OUTPATIENT
Start: 2024-11-20

## 2024-11-20 RX ORDER — ONDANSETRON 2 MG/ML
4 INJECTION INTRAMUSCULAR; INTRAVENOUS EVERY 6 HOURS PRN
Status: CANCELLED | OUTPATIENT
Start: 2024-11-20

## 2024-11-20 RX ORDER — FENTANYL CITRATE 50 UG/ML
100 INJECTION, SOLUTION INTRAMUSCULAR; INTRAVENOUS
Status: CANCELLED | OUTPATIENT
Start: 2024-11-20 | End: 2024-11-27

## 2024-11-20 RX ORDER — SODIUM CHLORIDE 0.9 % (FLUSH) 0.9 %
10 SYRINGE (ML) INJECTION EVERY 12 HOURS SCHEDULED
Status: CANCELLED | OUTPATIENT
Start: 2024-11-20

## 2024-11-20 RX ORDER — OXYTOCIN/0.9 % SODIUM CHLORIDE 30/500 ML
2-24 PLASTIC BAG, INJECTION (ML) INTRAVENOUS
Status: CANCELLED | OUTPATIENT
Start: 2024-11-20

## 2024-11-20 RX ORDER — MAGNESIUM CARB/ALUMINUM HYDROX 105-160MG
30 TABLET,CHEWABLE ORAL ONCE
Status: CANCELLED | OUTPATIENT
Start: 2024-11-20 | End: 2024-11-20

## 2024-11-20 RX ORDER — OXYTOCIN/0.9 % SODIUM CHLORIDE 30/500 ML
30 PLASTIC BAG, INJECTION (ML) INTRAVENOUS ONCE
Status: CANCELLED | OUTPATIENT
Start: 2024-11-20 | End: 2024-11-20

## 2024-11-20 RX ORDER — TERBUTALINE SULFATE 1 MG/ML
0.25 INJECTION, SOLUTION SUBCUTANEOUS AS NEEDED
Status: CANCELLED | OUTPATIENT
Start: 2024-11-20

## 2024-11-20 RX ORDER — MORPHINE SULFATE 2 MG/ML
2 INJECTION, SOLUTION INTRAMUSCULAR; INTRAVENOUS
Status: CANCELLED | OUTPATIENT
Start: 2024-11-20 | End: 2024-11-25

## 2024-11-20 RX ORDER — PROMETHAZINE HYDROCHLORIDE 12.5 MG/1
12.5 SUPPOSITORY RECTAL EVERY 6 HOURS PRN
Status: CANCELLED | OUTPATIENT
Start: 2024-11-20

## 2024-11-20 RX ORDER — SODIUM CHLORIDE 0.9 % (FLUSH) 0.9 %
10 SYRINGE (ML) INJECTION AS NEEDED
Status: CANCELLED | OUTPATIENT
Start: 2024-11-20

## 2024-11-20 RX ORDER — PROMETHAZINE HYDROCHLORIDE 12.5 MG/1
12.5 TABLET ORAL EVERY 6 HOURS PRN
Status: CANCELLED | OUTPATIENT
Start: 2024-11-20

## 2024-11-20 RX ORDER — METHYLERGONOVINE MALEATE 0.2 MG/ML
200 INJECTION INTRAVENOUS ONCE AS NEEDED
Status: CANCELLED | OUTPATIENT
Start: 2024-11-20

## 2024-11-20 RX ORDER — OXYTOCIN/0.9 % SODIUM CHLORIDE 30/500 ML
30 PLASTIC BAG, INJECTION (ML) INTRAVENOUS CONTINUOUS
Status: CANCELLED | OUTPATIENT
Start: 2024-11-20 | End: 2024-11-20

## 2024-11-24 ENCOUNTER — HOSPITAL ENCOUNTER (OUTPATIENT)
Dept: LABOR AND DELIVERY | Facility: HOSPITAL | Age: 34
Discharge: HOME | End: 2024-11-24
Payer: COMMERCIAL

## 2024-11-24 ENCOUNTER — HOSPITAL ENCOUNTER (INPATIENT)
Facility: HOSPITAL | Age: 34
LOS: 3 days | Discharge: HOME | End: 2024-11-27
Attending: OBSTETRICS & GYNECOLOGY | Admitting: OBSTETRICS & GYNECOLOGY
Payer: COMMERCIAL

## 2024-11-24 DIAGNOSIS — Z34.90 TERM PREGNANCY: ICD-10-CM

## 2024-11-24 LAB
DEPRECATED RDW RBC AUTO: 45.7 FL (ref 37–54)
ERYTHROCYTE [DISTWIDTH] IN BLOOD BY AUTOMATED COUNT: 14.3 % (ref 12.3–15.4)
HCT VFR BLD AUTO: 36.4 % (ref 34–46.6)
HGB BLD-MCNC: 12.2 G/DL (ref 12–15.9)
MCH RBC QN AUTO: 29.5 PG (ref 26.6–33)
MCHC RBC AUTO-ENTMCNC: 33.5 G/DL (ref 31.5–35.7)
MCV RBC AUTO: 87.9 FL (ref 79–97)
PLATELET # BLD AUTO: 302 10*3/MM3 (ref 140–450)
PMV BLD AUTO: 10.1 FL (ref 6–12)
RBC # BLD AUTO: 4.14 10*6/MM3 (ref 3.77–5.28)
WBC NRBC COR # BLD AUTO: 10.29 10*3/MM3 (ref 3.4–10.8)

## 2024-11-24 PROCEDURE — 59200 INSERT CERVICAL DILATOR: CPT | Performed by: OBSTETRICS & GYNECOLOGY

## 2024-11-24 PROCEDURE — 86900 BLOOD TYPING SEROLOGIC ABO: CPT | Performed by: OBSTETRICS & GYNECOLOGY

## 2024-11-24 PROCEDURE — 86780 TREPONEMA PALLIDUM: CPT | Performed by: OBSTETRICS & GYNECOLOGY

## 2024-11-24 PROCEDURE — 85027 COMPLETE CBC AUTOMATED: CPT | Performed by: OBSTETRICS & GYNECOLOGY

## 2024-11-24 PROCEDURE — 25810000003 LACTATED RINGERS PER 1000 ML: Performed by: OBSTETRICS & GYNECOLOGY

## 2024-11-24 PROCEDURE — 86850 RBC ANTIBODY SCREEN: CPT | Performed by: OBSTETRICS & GYNECOLOGY

## 2024-11-24 PROCEDURE — 3E0P7VZ INTRODUCTION OF HORMONE INTO FEMALE REPRODUCTIVE, VIA NATURAL OR ARTIFICIAL OPENING: ICD-10-PCS | Performed by: OBSTETRICS & GYNECOLOGY

## 2024-11-24 PROCEDURE — 86901 BLOOD TYPING SEROLOGIC RH(D): CPT | Performed by: OBSTETRICS & GYNECOLOGY

## 2024-11-24 RX ORDER — PROMETHAZINE HYDROCHLORIDE 12.5 MG/1
12.5 SUPPOSITORY RECTAL EVERY 6 HOURS PRN
Status: DISCONTINUED | OUTPATIENT
Start: 2024-11-24 | End: 2024-11-25 | Stop reason: HOSPADM

## 2024-11-24 RX ORDER — LIDOCAINE HYDROCHLORIDE 10 MG/ML
0.5 INJECTION, SOLUTION EPIDURAL; INFILTRATION; INTRACAUDAL; PERINEURAL ONCE AS NEEDED
Status: DISCONTINUED | OUTPATIENT
Start: 2024-11-24 | End: 2024-11-25

## 2024-11-24 RX ORDER — FENTANYL CITRATE 50 UG/ML
100 INJECTION, SOLUTION INTRAMUSCULAR; INTRAVENOUS
Status: DISCONTINUED | OUTPATIENT
Start: 2024-11-24 | End: 2024-11-25 | Stop reason: ALTCHOICE

## 2024-11-24 RX ORDER — SODIUM CHLORIDE, SODIUM LACTATE, POTASSIUM CHLORIDE, CALCIUM CHLORIDE 600; 310; 30; 20 MG/100ML; MG/100ML; MG/100ML; MG/100ML
75 INJECTION, SOLUTION INTRAVENOUS CONTINUOUS
Status: DISCONTINUED | OUTPATIENT
Start: 2024-11-24 | End: 2024-11-25

## 2024-11-24 RX ORDER — ONDANSETRON 2 MG/ML
4 INJECTION INTRAMUSCULAR; INTRAVENOUS EVERY 6 HOURS PRN
Status: DISCONTINUED | OUTPATIENT
Start: 2024-11-24 | End: 2024-11-25 | Stop reason: HOSPADM

## 2024-11-24 RX ORDER — SODIUM CHLORIDE 0.9 % (FLUSH) 0.9 %
10 SYRINGE (ML) INJECTION EVERY 12 HOURS SCHEDULED
Status: DISCONTINUED | OUTPATIENT
Start: 2024-11-24 | End: 2024-11-25

## 2024-11-24 RX ORDER — SODIUM CHLORIDE 9 MG/ML
40 INJECTION, SOLUTION INTRAVENOUS AS NEEDED
Status: DISCONTINUED | OUTPATIENT
Start: 2024-11-24 | End: 2024-11-25

## 2024-11-24 RX ORDER — ACETAMINOPHEN 325 MG/1
650 TABLET ORAL EVERY 4 HOURS PRN
Status: DISCONTINUED | OUTPATIENT
Start: 2024-11-24 | End: 2024-11-25 | Stop reason: HOSPADM

## 2024-11-24 RX ORDER — TERBUTALINE SULFATE 1 MG/ML
0.25 INJECTION, SOLUTION SUBCUTANEOUS AS NEEDED
Status: DISCONTINUED | OUTPATIENT
Start: 2024-11-24 | End: 2024-11-25

## 2024-11-24 RX ORDER — FENTANYL CITRATE 50 UG/ML
50 INJECTION, SOLUTION INTRAMUSCULAR; INTRAVENOUS
Status: DISCONTINUED | OUTPATIENT
Start: 2024-11-24 | End: 2024-11-25 | Stop reason: ALTCHOICE

## 2024-11-24 RX ORDER — MAGNESIUM CARB/ALUMINUM HYDROX 105-160MG
30 TABLET,CHEWABLE ORAL ONCE
Status: DISCONTINUED | OUTPATIENT
Start: 2024-11-24 | End: 2024-11-25

## 2024-11-24 RX ORDER — PROMETHAZINE HYDROCHLORIDE 12.5 MG/1
12.5 TABLET ORAL EVERY 6 HOURS PRN
Status: DISCONTINUED | OUTPATIENT
Start: 2024-11-24 | End: 2024-11-25 | Stop reason: HOSPADM

## 2024-11-24 RX ORDER — ONDANSETRON 4 MG/1
4 TABLET, ORALLY DISINTEGRATING ORAL EVERY 6 HOURS PRN
Status: DISCONTINUED | OUTPATIENT
Start: 2024-11-24 | End: 2024-11-25 | Stop reason: HOSPADM

## 2024-11-24 RX ORDER — PRENATAL WITH FERROUS FUM AND FOLIC ACID 3080; 920; 120; 400; 22; 1.84; 3; 20; 10; 1; 12; 200; 27; 25; 2 [IU]/1; [IU]/1; MG/1; [IU]/1; MG/1; MG/1; MG/1; MG/1; MG/1; MG/1; UG/1; MG/1; MG/1; MG/1; MG/1
1 TABLET ORAL DAILY
COMMUNITY

## 2024-11-24 RX ORDER — SODIUM CHLORIDE 0.9 % (FLUSH) 0.9 %
10 SYRINGE (ML) INJECTION AS NEEDED
Status: DISCONTINUED | OUTPATIENT
Start: 2024-11-24 | End: 2024-11-25

## 2024-11-24 RX ORDER — OXYTOCIN/0.9 % SODIUM CHLORIDE 30/500 ML
2-24 PLASTIC BAG, INJECTION (ML) INTRAVENOUS
Status: DISCONTINUED | OUTPATIENT
Start: 2024-11-24 | End: 2024-11-25

## 2024-11-24 RX ORDER — MORPHINE SULFATE 2 MG/ML
2 INJECTION, SOLUTION INTRAMUSCULAR; INTRAVENOUS
Status: DISCONTINUED | OUTPATIENT
Start: 2024-11-24 | End: 2024-11-25 | Stop reason: ALTCHOICE

## 2024-11-24 RX ADMIN — SODIUM CHLORIDE, POTASSIUM CHLORIDE, SODIUM LACTATE AND CALCIUM CHLORIDE 75 ML/HR: 600; 310; 30; 20 INJECTION, SOLUTION INTRAVENOUS at 22:56

## 2024-11-25 ENCOUNTER — ANESTHESIA (OUTPATIENT)
Dept: LABOR AND DELIVERY | Facility: HOSPITAL | Age: 34
End: 2024-11-25
Payer: COMMERCIAL

## 2024-11-25 ENCOUNTER — ANESTHESIA EVENT (OUTPATIENT)
Dept: LABOR AND DELIVERY | Facility: HOSPITAL | Age: 34
End: 2024-11-25
Payer: COMMERCIAL

## 2024-11-25 LAB
ABO GROUP BLD: NORMAL
ATMOSPHERIC PRESS: ABNORMAL MM[HG]
ATMOSPHERIC PRESS: ABNORMAL MM[HG]
BASE EXCESS BLDCOA CALC-SCNC: -5.7 MMOL/L (ref 0–2)
BASE EXCESS BLDCOV CALC-SCNC: -4.1 MMOL/L (ref 0–2)
BDY SITE: ABNORMAL
BDY SITE: ABNORMAL
BLD GP AB SCN SERPL QL: NEGATIVE
BODY TEMPERATURE: 37
BODY TEMPERATURE: 37
CO2 BLDA-SCNC: 23.5 MMOL/L (ref 22–33)
CO2 BLDA-SCNC: 24.8 MMOL/L (ref 22–33)
EPAP: 0
EPAP: 0
HCO3 BLDCOA-SCNC: 23.1 MMOL/L (ref 16.9–20.5)
HCO3 BLDCOV-SCNC: 22.2 MMOL/L (ref 18.6–21.4)
HGB BLDA-MCNC: 16.5 G/DL (ref 14–18)
HGB BLDA-MCNC: 16.9 G/DL (ref 14–18)
INHALED O2 CONCENTRATION: 21 %
INHALED O2 CONCENTRATION: 21 %
IPAP: 0
IPAP: 0
MODALITY: ABNORMAL
MODALITY: ABNORMAL
PAW @ PEAK INSP FLOW SETTING VENT: 0 CMH2O
PAW @ PEAK INSP FLOW SETTING VENT: 0 CMH2O
PCO2 BLDCOA: 56.5 MMHG (ref 43.3–54.9)
PCO2 BLDCOV: 43.6 MM HG (ref 28–40)
PH BLDCOA: 7.22 PH UNITS (ref 7.22–7.3)
PH BLDCOV: 7.32 PH UNITS (ref 7.31–7.37)
PO2 BLDCOA: 25.1 MMHG (ref 11.5–43.3)
PO2 BLDCOV: 28.4 MM HG (ref 21–31)
RH BLD: POSITIVE
SAO2 % BLDCOA: 42.6 %
SAO2 % BLDCOA: ABNORMAL %
SAO2 % BLDCOV: 61.9 %
T&S EXPIRATION DATE: NORMAL
TOTAL RATE: 0 BREATHS/MINUTE
TOTAL RATE: 0 BREATHS/MINUTE
TREPONEMA PALLIDUM IGG+IGM AB [PRESENCE] IN SERUM OR PLASMA BY IMMUNOASSAY: NORMAL
VENTILATOR MODE: ABNORMAL
VENTILATOR MODE: ABNORMAL

## 2024-11-25 PROCEDURE — 0KQM0ZZ REPAIR PERINEUM MUSCLE, OPEN APPROACH: ICD-10-PCS | Performed by: OBSTETRICS & GYNECOLOGY

## 2024-11-25 PROCEDURE — 59025 FETAL NON-STRESS TEST: CPT

## 2024-11-25 PROCEDURE — 25810000003 LACTATED RINGERS PER 1000 ML: Performed by: OBSTETRICS & GYNECOLOGY

## 2024-11-25 PROCEDURE — C1755 CATHETER, INTRASPINAL: HCPCS

## 2024-11-25 PROCEDURE — 25810000003 LACTATED RINGERS SOLUTION: Performed by: OBSTETRICS & GYNECOLOGY

## 2024-11-25 PROCEDURE — 25010000002 BUTORPHANOL PER 1 MG: Performed by: OBSTETRICS & GYNECOLOGY

## 2024-11-25 PROCEDURE — 82805 BLOOD GASES W/O2 SATURATION: CPT

## 2024-11-25 PROCEDURE — 25010000002 ROPIVACAINE PER 1 MG: Performed by: NURSE ANESTHETIST, CERTIFIED REGISTERED

## 2024-11-25 PROCEDURE — 25010000002 FENTANYL CITRATE (PF) 50 MCG/ML SOLUTION: Performed by: NURSE ANESTHETIST, CERTIFIED REGISTERED

## 2024-11-25 PROCEDURE — C1755 CATHETER, INTRASPINAL: HCPCS | Performed by: ANESTHESIOLOGY

## 2024-11-25 PROCEDURE — 25010000002 LIDOCAINE-EPINEPHRINE (PF) 1.5 %-1:200000 SOLUTION: Performed by: NURSE ANESTHETIST, CERTIFIED REGISTERED

## 2024-11-25 RX ORDER — MISOPROSTOL 200 UG/1
800 TABLET ORAL AS NEEDED
Status: DISCONTINUED | OUTPATIENT
Start: 2024-11-25 | End: 2024-11-27 | Stop reason: HOSPADM

## 2024-11-25 RX ORDER — ROPIVACAINE HYDROCHLORIDE 2 MG/ML
15 INJECTION, SOLUTION EPIDURAL; INFILTRATION; PERINEURAL CONTINUOUS
Status: DISCONTINUED | OUTPATIENT
Start: 2024-11-25 | End: 2024-11-25

## 2024-11-25 RX ORDER — CITRIC ACID/SODIUM CITRATE 334-500MG
30 SOLUTION, ORAL ORAL ONCE
Status: DISCONTINUED | OUTPATIENT
Start: 2024-11-25 | End: 2024-11-25

## 2024-11-25 RX ORDER — ONDANSETRON 2 MG/ML
4 INJECTION INTRAMUSCULAR; INTRAVENOUS ONCE AS NEEDED
Status: DISCONTINUED | OUTPATIENT
Start: 2024-11-25 | End: 2024-11-25

## 2024-11-25 RX ORDER — FENTANYL CITRATE 50 UG/ML
INJECTION, SOLUTION INTRAMUSCULAR; INTRAVENOUS AS NEEDED
Status: DISCONTINUED | OUTPATIENT
Start: 2024-11-25 | End: 2024-11-25 | Stop reason: SURG

## 2024-11-25 RX ORDER — SODIUM CHLORIDE, SODIUM LACTATE, POTASSIUM CHLORIDE, CALCIUM CHLORIDE 600; 310; 30; 20 MG/100ML; MG/100ML; MG/100ML; MG/100ML
125 INJECTION, SOLUTION INTRAVENOUS CONTINUOUS
Status: DISCONTINUED | OUTPATIENT
Start: 2024-11-25 | End: 2024-11-25

## 2024-11-25 RX ORDER — EPHEDRINE SULFATE 5 MG/ML
INJECTION INTRAVENOUS
Status: COMPLETED
Start: 2024-11-25 | End: 2024-11-25

## 2024-11-25 RX ORDER — HYDROCODONE BITARTRATE AND ACETAMINOPHEN 10; 325 MG/1; MG/1
1 TABLET ORAL EVERY 4 HOURS PRN
Status: DISCONTINUED | OUTPATIENT
Start: 2024-11-25 | End: 2024-11-27 | Stop reason: HOSPADM

## 2024-11-25 RX ORDER — OXYTOCIN/0.9 % SODIUM CHLORIDE 30/500 ML
30 PLASTIC BAG, INJECTION (ML) INTRAVENOUS CONTINUOUS
Status: ACTIVE | OUTPATIENT
Start: 2024-11-25 | End: 2024-11-25

## 2024-11-25 RX ORDER — OXYTOCIN/0.9 % SODIUM CHLORIDE 30/500 ML
PLASTIC BAG, INJECTION (ML) INTRAVENOUS
Status: COMPLETED
Start: 2024-11-25 | End: 2024-11-25

## 2024-11-25 RX ORDER — SODIUM CHLORIDE 0.9 % (FLUSH) 0.9 %
1-10 SYRINGE (ML) INJECTION AS NEEDED
Status: DISCONTINUED | OUTPATIENT
Start: 2024-11-25 | End: 2024-11-27 | Stop reason: HOSPADM

## 2024-11-25 RX ORDER — DIPHENHYDRAMINE HCL 25 MG
25 CAPSULE ORAL NIGHTLY PRN
Status: DISCONTINUED | OUTPATIENT
Start: 2024-11-25 | End: 2024-11-27 | Stop reason: HOSPADM

## 2024-11-25 RX ORDER — HYDROCODONE BITARTRATE AND ACETAMINOPHEN 5; 325 MG/1; MG/1
1 TABLET ORAL EVERY 4 HOURS PRN
Status: DISCONTINUED | OUTPATIENT
Start: 2024-11-25 | End: 2024-11-27 | Stop reason: HOSPADM

## 2024-11-25 RX ORDER — IBUPROFEN 600 MG/1
600 TABLET, FILM COATED ORAL EVERY 6 HOURS PRN
Status: DISCONTINUED | OUTPATIENT
Start: 2024-11-25 | End: 2024-11-27 | Stop reason: HOSPADM

## 2024-11-25 RX ORDER — SIMETHICONE 80 MG
80 TABLET,CHEWABLE ORAL 4 TIMES DAILY PRN
Status: DISCONTINUED | OUTPATIENT
Start: 2024-11-25 | End: 2024-11-27 | Stop reason: HOSPADM

## 2024-11-25 RX ORDER — ONDANSETRON 2 MG/ML
4 INJECTION INTRAMUSCULAR; INTRAVENOUS EVERY 6 HOURS PRN
Status: DISCONTINUED | OUTPATIENT
Start: 2024-11-25 | End: 2024-11-25 | Stop reason: HOSPADM

## 2024-11-25 RX ORDER — ESCITALOPRAM OXALATE 10 MG/1
5 TABLET ORAL DAILY
Status: DISCONTINUED | OUTPATIENT
Start: 2024-11-25 | End: 2024-11-26

## 2024-11-25 RX ORDER — METHYLERGONOVINE MALEATE 0.2 MG/ML
200 INJECTION INTRAVENOUS ONCE AS NEEDED
Status: DISCONTINUED | OUTPATIENT
Start: 2024-11-25 | End: 2024-11-25 | Stop reason: HOSPADM

## 2024-11-25 RX ORDER — ONDANSETRON 4 MG/1
4 TABLET, ORALLY DISINTEGRATING ORAL EVERY 6 HOURS PRN
Status: DISCONTINUED | OUTPATIENT
Start: 2024-11-25 | End: 2024-11-25 | Stop reason: HOSPADM

## 2024-11-25 RX ORDER — PROMETHAZINE HYDROCHLORIDE 12.5 MG/1
12.5 TABLET ORAL EVERY 4 HOURS PRN
Status: DISCONTINUED | OUTPATIENT
Start: 2024-11-25 | End: 2024-11-27 | Stop reason: HOSPADM

## 2024-11-25 RX ORDER — METOCLOPRAMIDE HYDROCHLORIDE 5 MG/ML
10 INJECTION INTRAMUSCULAR; INTRAVENOUS ONCE AS NEEDED
Status: DISCONTINUED | OUTPATIENT
Start: 2024-11-25 | End: 2024-11-25

## 2024-11-25 RX ORDER — ONDANSETRON 2 MG/ML
4 INJECTION INTRAMUSCULAR; INTRAVENOUS EVERY 6 HOURS PRN
Status: DISCONTINUED | OUTPATIENT
Start: 2024-11-25 | End: 2024-11-27 | Stop reason: HOSPADM

## 2024-11-25 RX ORDER — ONDANSETRON 4 MG/1
4 TABLET, ORALLY DISINTEGRATING ORAL EVERY 8 HOURS PRN
Status: DISCONTINUED | OUTPATIENT
Start: 2024-11-25 | End: 2024-11-27 | Stop reason: HOSPADM

## 2024-11-25 RX ORDER — LIDOCAINE HYDROCHLORIDE AND EPINEPHRINE 15; 5 MG/ML; UG/ML
INJECTION, SOLUTION EPIDURAL AS NEEDED
Status: DISCONTINUED | OUTPATIENT
Start: 2024-11-25 | End: 2024-11-25 | Stop reason: SURG

## 2024-11-25 RX ORDER — FAMOTIDINE 10 MG/ML
20 INJECTION, SOLUTION INTRAVENOUS ONCE AS NEEDED
Status: DISCONTINUED | OUTPATIENT
Start: 2024-11-25 | End: 2024-11-25

## 2024-11-25 RX ORDER — EPHEDRINE SULFATE 5 MG/ML
10 INJECTION INTRAVENOUS
Status: DISCONTINUED | OUTPATIENT
Start: 2024-11-25 | End: 2024-11-25

## 2024-11-25 RX ORDER — BUTORPHANOL TARTRATE 2 MG/ML
2 INJECTION, SOLUTION INTRAMUSCULAR; INTRAVENOUS
Status: DISCONTINUED | OUTPATIENT
Start: 2024-11-25 | End: 2024-11-25

## 2024-11-25 RX ORDER — CARBOPROST TROMETHAMINE 250 UG/ML
250 INJECTION, SOLUTION INTRAMUSCULAR AS NEEDED
Status: DISCONTINUED | OUTPATIENT
Start: 2024-11-25 | End: 2024-11-27 | Stop reason: HOSPADM

## 2024-11-25 RX ORDER — OXYTOCIN/0.9 % SODIUM CHLORIDE 30/500 ML
30 PLASTIC BAG, INJECTION (ML) INTRAVENOUS ONCE
Status: COMPLETED | OUTPATIENT
Start: 2024-11-25 | End: 2024-11-25

## 2024-11-25 RX ORDER — MISOPROSTOL 200 UG/1
800 TABLET ORAL AS NEEDED
Status: DISCONTINUED | OUTPATIENT
Start: 2024-11-25 | End: 2024-11-25 | Stop reason: HOSPADM

## 2024-11-25 RX ORDER — CALCIUM CARBONATE 500 MG/1
1 TABLET, CHEWABLE ORAL 3 TIMES DAILY PRN
Status: DISCONTINUED | OUTPATIENT
Start: 2024-11-25 | End: 2024-11-27 | Stop reason: HOSPADM

## 2024-11-25 RX ORDER — BUTORPHANOL TARTRATE 1 MG/ML
1 INJECTION, SOLUTION INTRAMUSCULAR; INTRAVENOUS
Status: DISCONTINUED | OUTPATIENT
Start: 2024-11-25 | End: 2024-11-25

## 2024-11-25 RX ORDER — HYDROCORTISONE 25 MG/G
1 CREAM TOPICAL AS NEEDED
Status: DISCONTINUED | OUTPATIENT
Start: 2024-11-25 | End: 2024-11-27 | Stop reason: HOSPADM

## 2024-11-25 RX ORDER — OXYTOCIN/0.9 % SODIUM CHLORIDE 30/500 ML
125 PLASTIC BAG, INJECTION (ML) INTRAVENOUS ONCE AS NEEDED
Status: DISCONTINUED | OUTPATIENT
Start: 2024-11-25 | End: 2024-11-27 | Stop reason: HOSPADM

## 2024-11-25 RX ORDER — DOCUSATE SODIUM 100 MG/1
100 CAPSULE, LIQUID FILLED ORAL 2 TIMES DAILY
Status: DISCONTINUED | OUTPATIENT
Start: 2024-11-25 | End: 2024-11-27 | Stop reason: HOSPADM

## 2024-11-25 RX ORDER — CARBOPROST TROMETHAMINE 250 UG/ML
250 INJECTION, SOLUTION INTRAMUSCULAR AS NEEDED
Status: DISCONTINUED | OUTPATIENT
Start: 2024-11-25 | End: 2024-11-25 | Stop reason: HOSPADM

## 2024-11-25 RX ORDER — DIPHENHYDRAMINE HYDROCHLORIDE 50 MG/ML
12.5 INJECTION INTRAMUSCULAR; INTRAVENOUS EVERY 8 HOURS PRN
Status: DISCONTINUED | OUTPATIENT
Start: 2024-11-25 | End: 2024-11-25

## 2024-11-25 RX ORDER — METHYLERGONOVINE MALEATE 0.2 MG/ML
200 INJECTION INTRAVENOUS ONCE AS NEEDED
Status: DISCONTINUED | OUTPATIENT
Start: 2024-11-25 | End: 2024-11-27 | Stop reason: HOSPADM

## 2024-11-25 RX ORDER — ACETAMINOPHEN 325 MG/1
650 TABLET ORAL EVERY 6 HOURS PRN
Status: DISCONTINUED | OUTPATIENT
Start: 2024-11-25 | End: 2024-11-27 | Stop reason: HOSPADM

## 2024-11-25 RX ORDER — PRENATAL VIT/IRON FUM/FOLIC AC 27MG-0.8MG
1 TABLET ORAL DAILY
Status: DISCONTINUED | OUTPATIENT
Start: 2024-11-26 | End: 2024-11-27 | Stop reason: HOSPADM

## 2024-11-25 RX ADMIN — ROPIVACAINE HYDROCHLORIDE 10 ML: 5 INJECTION, SOLUTION EPIDURAL; INFILTRATION; PERINEURAL at 08:17

## 2024-11-25 RX ADMIN — BUTORPHANOL TARTRATE 2 MG: 2 INJECTION, SOLUTION INTRAMUSCULAR; INTRAVENOUS at 06:30

## 2024-11-25 RX ADMIN — Medication 30 UNITS: at 17:05

## 2024-11-25 RX ADMIN — SODIUM CHLORIDE, POTASSIUM CHLORIDE, SODIUM LACTATE AND CALCIUM CHLORIDE 75 ML/HR: 600; 310; 30; 20 INJECTION, SOLUTION INTRAVENOUS at 10:20

## 2024-11-25 RX ADMIN — DOCUSATE SODIUM 100 MG: 100 CAPSULE, LIQUID FILLED ORAL at 21:13

## 2024-11-25 RX ADMIN — LIDOCAINE HYDROCHLORIDE AND EPINEPHRINE 3 ML: 15; 5 INJECTION, SOLUTION EPIDURAL at 08:10

## 2024-11-25 RX ADMIN — SODIUM CHLORIDE, POTASSIUM CHLORIDE, SODIUM LACTATE AND CALCIUM CHLORIDE 1000 ML: 600; 310; 30; 20 INJECTION, SOLUTION INTRAVENOUS at 07:52

## 2024-11-25 RX ADMIN — BUTORPHANOL TARTRATE 2 MG: 2 INJECTION, SOLUTION INTRAMUSCULAR; INTRAVENOUS at 00:55

## 2024-11-25 RX ADMIN — WITCH HAZEL: 500 SOLUTION RECTAL; TOPICAL at 21:13

## 2024-11-25 RX ADMIN — SODIUM CHLORIDE, POTASSIUM CHLORIDE, SODIUM LACTATE AND CALCIUM CHLORIDE 500 ML: 600; 310; 30; 20 INJECTION, SOLUTION INTRAVENOUS at 12:30

## 2024-11-25 RX ADMIN — Medication: at 21:13

## 2024-11-25 RX ADMIN — EPHEDRINE SULFATE 10 MG: 5 INJECTION INTRAVENOUS at 10:10

## 2024-11-25 RX ADMIN — IBUPROFEN 600 MG: 600 TABLET, FILM COATED ORAL at 21:13

## 2024-11-25 RX ADMIN — Medication 30 UNITS: at 16:45

## 2024-11-25 RX ADMIN — BUTORPHANOL TARTRATE 2 MG: 2 INJECTION, SOLUTION INTRAMUSCULAR; INTRAVENOUS at 03:01

## 2024-11-25 RX ADMIN — SODIUM CHLORIDE, POTASSIUM CHLORIDE, SODIUM LACTATE AND CALCIUM CHLORIDE 75 ML/HR: 600; 310; 30; 20 INJECTION, SOLUTION INTRAVENOUS at 12:54

## 2024-11-25 RX ADMIN — Medication 2 MILLI-UNITS/MIN: at 00:02

## 2024-11-25 RX ADMIN — FENTANYL CITRATE 100 MCG: 50 INJECTION, SOLUTION INTRAMUSCULAR; INTRAVENOUS at 08:13

## 2024-11-25 RX ADMIN — SODIUM CHLORIDE, POTASSIUM CHLORIDE, SODIUM LACTATE AND CALCIUM CHLORIDE 75 ML/HR: 600; 310; 30; 20 INJECTION, SOLUTION INTRAVENOUS at 08:26

## 2024-11-25 RX ADMIN — ROPIVACAINE HYDROCHLORIDE 15 ML/HR: 2 INJECTION, SOLUTION EPIDURAL; INFILTRATION at 08:18

## 2024-11-25 RX ADMIN — SODIUM CHLORIDE, POTASSIUM CHLORIDE, SODIUM LACTATE AND CALCIUM CHLORIDE 125 ML/HR: 600; 310; 30; 20 INJECTION, SOLUTION INTRAVENOUS at 12:55

## 2024-11-25 RX ADMIN — Medication 1 APPLICATION: at 21:13

## 2024-11-25 NOTE — H&P
[facility]  Obstetric History and Physical    CC: IOL    SUBJECTIVE:     Patient is a 34 y.o. female  currently at 39w4d, who presents with uncomplicated pregnancy for IOL.      Prenatal Information:  Prenatal Results       Initial Prenatal Labs       Test Value Reference Range Date Time    Hemoglobin  13.2 g/dL 12.0 - 15.9 24 1035    Hematocrit  40.3 % 34.0 - 46.6 24 1035    Platelets  426 10*3/mm3 140 - 450 24 1035    Rubella IgG  2.97 index Immune >0.99 24 1035    Hepatitis B SAg  Non-Reactive  Non-Reactive 24 1035    Hepatitis C Ab  Non-Reactive  Non-Reactive 24 1035    RPR        T. Pallidum Ab   Non-Reactive  Non-Reactive 24 1029       Non Reactive  Non Reactive 24 1035    ABO  O   24    Rh  Positive   24    Antibody Screen  Negative   24 1035    HIV  Non-Reactive  Non-Reactive 24 1035    Urine Culture  No growth   24 1035    Gonorrhea  Negative  Negative 24 1035    Chlamydia  Negative  Negative 24 1035    TSH        HgB A1c         Varicella IgG        Hemoglobinopathy Fractionation        Hemoglobinopathy (genetic testing)        Cystic fibrosis         Spinal muscular atrophy        Fragile X                  Fetal testing        Test Value Reference Range Date Time    NIPT        MSAFP        AFP-4                  2nd and 3rd Trimester       Test Value Reference Range Date Time    Hemoglobin (repeated)  12.2 g/dL 12.0 - 15.9 24       11.3 g/dL 12.0 - 15.9 24 1029    Hematocrit (repeated)  36.4 % 34.0 - 46.6 24       34.5 % 34.0 - 46.6 24 1029    Platelets   302 10*3/mm3 140 - 450 24       361 10*3/mm3 140 - 450 24 1029       426 10*3/mm3 140 - 450 24 1035    1 hour GTT   81 mg/dL 65 - 139 24 1029    Antibody Screen (repeated)  Negative   24    3rd TM syphilis scrn (repeated)  RPR         3rd TM syphilis scrn (repeated) TP-Ab         3rd TM syphilis screen TB-Ab (FTA)        Syphilis cascade test TP-Ab (EIA)        Syphilis cascade TPPA        GTT Fasting        GTT 1 Hr        GTT 2 Hr        GTT 3 Hr        Group B Strep ^ Negative   10/31/24               Other testing        Test Value Reference Range Date Time    Parvo IgG         CMV IgG                   Drug Screening       Test Value Reference Range Date Time    Amphetamine Screen  Negative  Negative 04/30/24 1035    Barbiturate Screen  Negative  Negative 04/30/24 1035    Benzodiazepine Screen  Negative  Negative 04/30/24 1035    Methadone Screen  Negative  Negative 04/30/24 1035    Phencyclidine Screen  Negative  Negative 04/30/24 1035    Opiates Screen  Negative  Negative 04/30/24 1035    THC Screen  Negative  Negative 04/30/24 1035    Cocaine Screen  Negative  Negative 04/30/24 1035    Propoxyphene Screen        Buprenorphine Screen  Negative  Negative 04/30/24 1035    Methamphetamine Screen  Negative  Negative 04/30/24 1035    Oxycodone Screen  Negative  Negative 04/30/24 1035    Tricyclic Antidepressants Screen  Negative  Negative 04/30/24 1035              Legend    ^: Historical                          External Prenatal Results       Pregnancy Outside Results - Transcribed From Office Records - See Scanned Records For Details       Test Value Date Time    ABO  O  11/24/24 2309    Rh  Positive  11/24/24 2309    Antibody Screen  Negative  11/24/24 2309       Negative  08/06/24 1029       Negative  04/30/24 1035    Varicella IgG       Rubella  2.97 index 04/30/24 1035    Hgb  12.2 g/dL 11/24/24 2309       11.3 g/dL 08/06/24 1029       13.2 g/dL 04/30/24 1035    Hct  36.4 % 11/24/24 2309       34.5 % 08/06/24 1029       40.3 % 04/30/24 1035    HgB A1c        1h GTT  81 mg/dL 08/06/24 1029    3h GTT Fasting       3h GTT 1 hour       3h GTT 2 hour       3h GTT 3 hour        Gonorrhea (discrete)  Negative  04/30/24 1035    Chlamydia (discrete)  Negative  04/30/24 1035    RPR        Syphils cascade: TP-Ab (FTA)  Non-Reactive  24 1029       Non Reactive  24 1035    TP-Ab  Non-Reactive  24 1029       Non Reactive  24 1035    TP-Ab (EIA)       TPPA       HBsAg  Non-Reactive  24 1035    Herpes Simplex Virus PCR       Herpes Simplex VIrus Culture       HIV  Non-Reactive  24 1035    Hep C RNA Quant PCR       Hep C Antibody  Non-Reactive  24 1035    AFP       NIPT       Cystic Fibrosis ([place])       Cystic Fibroisis        Spinal Muscular atrophy       Fragile X       Group B Strep ^ Negative  10/31/24     GBS Susceptibility to Clindamycin       GBS Susceptibility to Erythromycin       Fetal Fibronectin       Genetic Testing, Maternal Blood                 Drug Screening       Test Value Date Time    Urine Drug Screen       Amphetamine Screen  Negative  24 1035    Barbiturate Screen  Negative  24 1035    Benzodiazepine Screen  Negative  24 1035    Methadone Screen  Negative  24 1035    Phencyclidine Screen  Negative  24 1035    Opiates Screen  Negative  24 1035    THC Screen  Negative  24 1035    Cocaine Screen       Propoxyphene Screen       Buprenorphine Screen  Negative  24 1035    Methamphetamine Screen       Oxycodone Screen  Negative  24 1035    Tricyclic Antidepressants Screen  Negative  24 1035              Legend    ^: Historical                             OB History:                     OB History    Para Term  AB Living   3 1 1 0 1 1   SAB IAB Ectopic Molar Multiple Live Births   0 0 0 0 0 1      # Outcome Date GA Lbr Gigi/2nd Weight Sex Type Anes PTL Lv   3 Current            2 Term 10/25/22 41w2d 24:11 / 02:00 3225 g (7 lb 1.8 oz) F Vag-Spont EPI N USAMA      Name: BRIEN,EFFIENSGIRL      Apgar1: 8  Apgar5: 9   1 AB 21 9w0d    SAB         Allergies:                      No Known Allergies   Past Medical History: Past Medical History:   Diagnosis Date    Anxiety      Depression       Past Surgical History No past surgical history on file.   Family History: Family History   Problem Relation Age of Onset    Colon cancer Maternal Grandmother     Diabetes Maternal Grandfather     Colon cancer Paternal Grandmother       Social History:  reports that she has never smoked. She does not have any smokeless tobacco history on file.   reports that she does not currently use alcohol.   reports no history of drug use.    General ROS: Pertinent items are noted in HPI, all other systems reviewed and negative   Medications: Prenatal 27-1, calcium carbonate, docusate sodium, escitalopram, and ibuprofen       Objective       Vital Signs Range for the last 24 hours  Temperature: Temp:  [97.8 °F (36.6 °C)-98.3 °F (36.8 °C)] 98 °F (36.7 °C)   BP: BP: (117-125)/(60-78) 124/78   Pulse: Heart Rate:  [76-88] 86   Respirations: Resp:  [16-18] 16   SPO2:                 Physical Examination: General appearance - alert, well appearing, and in no distress  Chest - clear to auscultation, no wheezes, rales or rhonchi, symmetric air entry  Heart - normal rate, regular rhythm, normal S1, S2, no murmurs, rubs, clicks or gallops  Abdomen - Soft, gravid, nontender  Extremities - pedal edema tr +      Presentation: VTX   Cervix: Exam by: Method: sterile vaginal exam performed   Dilation: Cervical Dilation (cm): 4-5   Effacement: Cervical Effacement: 60   Station: Fetal Station: 1-->-2     AROM clear fluid    Fetal Heart Rate Assessment           Baseline: Fetal HR Baseline: normal range   Variability: Fetal HR Variability: minimal (detectable, amplitude less than or equal to 5 bpm)   Accels: Fetal HR Accelerations: absent   Decels: Fetal HR Decelerations: absent   Tracing Category:       Uterine Assessment   Method: Method: external tocotransducer   Frequency (min): Contraction Frequency (Minutes): 3   Ctx Count in 10 min: Contractions in 10 Minutes: X1     Laboratory Results:  CBC:      Lab 11/24/24  2309   WBC  10.29   HEMOGLOBIN 12.2   HEMATOCRIT 36.4   PLATELETS 302   MCV 87.9              Assessment/Plan:   IUP 39w4d for IOL    1.Admit for IOL  2.Cont PPP  3.GBS neg  4. FWB reassuring      Swati Jacobs MD  11/25/2024  07:50 EST

## 2024-11-25 NOTE — PROCEDURES
Transcervical Rao placed per physician request.  FHT's class 1.  Patient tolerated well. 24 [place], 60ml.    Ernst Ferris MD  11/24/2024  23:28 EST

## 2024-11-25 NOTE — L&D DELIVERY NOTE
[facility]   Vaginal Delivery Note    Patient Name: Nieves Erickson  : 1990  MRN: [id number]    Date of Delivery: 2024    Diagnosis     Pre & Post-Delivery:  Intrauterine pregnancy at 39w4d  Labor status: Induced Onset of Labor    Term pregnancy             Problem List    Transfer to Postpartum     Review the Delivery Report for details.     Delivery     Delivery: Vaginal, Spontaneous    YOB: 2024   Time of Birth:  Gestational Age 4:22 PM  39w4d     Anesthesia: Epidural    Delivering clinician: Swati Jacobs   Forceps?   No   Vacuum? No    Shoulder dystocia present: Yes Shoulder Dystocia Details  Dystocia Present?    Time head delivered: 2024  4:21 PM  Gentle attempt at traction, assisted by maternal expulsive forces:  yes  If no, why:    Anterior shoulder:  right  Time recognized:      Time help called:   Called by:     Time provider arrived:   Provider who arrived:     Time NICU arrived:   NICU staff:     Time additional staff arrived:   Additional staff:            Delivery narrative:  The patient progressed to complete and delivered a VMI  with Apgars 7/9 the weight is  7#2 via  with epidural anesthesia. 10 second right anterior shoulder dystocia encountered, resolved with Dwain and downward traction. The cord was clamped and cut  and the infant was evaluated by RN and DRT staff. Placenta delivered spontaneously intact. 30 units of IV Pitocin was started. Uterine tone was appropriate.   Small second degree laceration(s) noted  and repaired with 2-0 Vicryl.   The patient tolerated the procedure well and remained in the LDR for recovery. All counts correct.        Infant     Findings: male infant     Infant observations: Weight: 3220 g (7 lb 1.6 oz)  Length: 19.75 in  Observations/Comments:        Apgars:  7 @ 1 minute /     9 @ 5 minutes   Infant Name:      Placenta & Cord         Placenta delivered    Spontaneous at        Cord:   present.   Nuchal Cord?   "no   Cord blood obtained:  yes   Cord gases obtained:   no   Cord gas results: Venous:  No results found for: \"PHCVEN\", \"BECVEN\"    Arterial:  No results found for: \"PHCART\", \"BECART\"     Repair     Episiotomy: Not recorded    No    Lacerations: Yes  Laceration Information  Laceration Repaired?   Perineal:  2d  yes   Periurethral:       Labial:       Sulcus:       Vaginal:       Cervical:         Suture used for repair: 2-0 Vicryl  Laceration Length for 3rd or 4th degree lacerations:  cm   Estimated Blood Loss:       Quantitative Blood Loss:  100mL        Complications     none    Disposition     Mother to Mother Baby/Postpartum  in stable condition currently.  Baby to remains with mom  in stable condition currently.    Swati Jacobs MD  11/25/24  16:38 EST        "

## 2024-11-25 NOTE — PROGRESS NOTES
[facility]     [facility] Progress [facility]    CC: Labor    IUP 39w4d, comfortable. Pit remains off.     Vitals:    11/25/24 1503   BP: 128/64   Pulse: 99   Resp:    Temp:        Fetal Heart Rate Assessment           Baseline: Fetal HR Baseline: normal range   Variability: Fetal HR Variability: moderate (amplitude range 6 to 25 bpm)   Accels: Fetal HR Accelerations: greater than/equal to 15 bpm, lasting at least 15 seconds   Decels: Fetal HR Decelerations: variable   Tracing Category:       Uterine Assessment   Method: Method: IUPC (intrauterine pressure catheter) (poor tracing)   Frequency (min): Contraction Frequency (Minutes): 3   Ctx Count in 10 min: Contractions in 10 Minutes: X1   Intensity by IUPC:     Resting Tone by IUPC:       Cervix: Exam by: Method: sterile vaginal exam performed   Dilation: 9   Effacement: 90   Station: 0            Assessment: IUP 39w4d     Plan: cont mgmt.   Ok to turn pit on if ctx space further    Swati Jacobs MD  15:33 EST  11/25/24

## 2024-11-25 NOTE — PROGRESS NOTES
[facility]     Labor Progress Note    CC: Labor    IUP 39w4d, comfortable with epidural     Vitals:    24 1149   BP: 119/70   Pulse: 100   Resp:    Temp:        Fetal Heart Rate Assessment           Baseline: Fetal HR Baseline: normal range   Variability: Fetal HR Variability: moderate (amplitude range 6 to 25 bpm)   Accels: Fetal HR Accelerations: greater than/equal to 15 bpm, lasting at least 15 seconds   Decels: Fetal HR Decelerations: recurrent, variable   Tracing Category:       Uterine Assessment   Method: Method:  (poor tracing)   Frequency (min): Contraction Frequency (Minutes): 3   Ctx Count in 10 min: Contractions in 10 Minutes: X1   Intensity by IUPC:     Resting Tone by IUPC:       Cervix: Exam by: Method: sterile vaginal exam performed   Dilation: Cervical Dilation (cm): 5   Effacement: Cervical Effacement: 70   Station: Fetal Station: -2     IUPC placed to 45 cm.          Assessment: IUP 39w4d     Plan: recurrent variable, start amnioinfusion.   FWB reassuring overall. Will monitor  Expect     Swati Jacobs MD  12:24 EST  24

## 2024-11-25 NOTE — ANESTHESIA PREPROCEDURE EVALUATION
Anesthesia Evaluation     Patient summary reviewed and Nursing notes reviewed   NPO Solid Status: > 6 hours  NPO Liquid Status: < 2 hours           Airway   Mallampati: II  TM distance: >3 FB  Neck ROM: full  Dental      Pulmonary - negative pulmonary ROS   Cardiovascular - negative cardio ROS        Neuro/Psych- negative ROS  (+) syncope, psychiatric history Anxiety and Depression  GI/Hepatic/Renal/Endo - negative ROS     Musculoskeletal (-) negative ROS    Abdominal    Substance History - negative use     OB/GYN negative ob/gyn ROS   (+) Pregnant        Other - negative ROS                   Anesthesia Plan    ASA 2     epidural       Anesthetic plan, risks, benefits, and alternatives have been provided, discussed and informed consent has been obtained with: patient.    Plan discussed with attending.    CODE STATUS:    Level Of Support Discussed With: Patient  Code Status (Patient has no pulse and is not breathing): CPR (Attempt to Resuscitate)  Medical Interventions (Patient has pulse or is breathing): Full Support

## 2024-11-25 NOTE — ANESTHESIA PROCEDURE NOTES
Labor Epidural      Patient reassessed immediately prior to procedure    Patient location during procedure: floor  Performed By  CRNA/JODY: Veronica Ochoa CRNA  Preanesthetic Checklist  Completed: patient identified, IV checked, site marked, risks and benefits discussed, surgical consent, monitors and equipment checked, pre-op evaluation and timeout performed  Prep:  Pt Position:sitting  Sterile Tech:cap, gloves, mask and sterile barrier  Prep:DuraPrep  Monitoring:blood pressure monitoring  Epidural Block Procedure:  Approach:midline  Guidance:landmark technique and palpation technique  Location:L3-L4  Needle Type:Tuohy  Needle Gauge:17 G  Loss of Resistance Medium: air  Loss of Resistance: 6cm  Cath Depth at skin:11 cm  Aspiration:negative  Test Dose:negative  Number of Attempts: 1  Post Assessment:  Dressing:occlusive dressing applied and secured with tape  Pt Tolerance:patient tolerated the procedure well with no apparent complications  Complications:no

## 2024-11-26 LAB
BASOPHILS # BLD AUTO: 0.04 10*3/MM3 (ref 0–0.2)
BASOPHILS NFR BLD AUTO: 0.4 % (ref 0–1.5)
DEPRECATED RDW RBC AUTO: 47.9 FL (ref 37–54)
EOSINOPHIL # BLD AUTO: 0.09 10*3/MM3 (ref 0–0.4)
EOSINOPHIL NFR BLD AUTO: 0.8 % (ref 0.3–6.2)
ERYTHROCYTE [DISTWIDTH] IN BLOOD BY AUTOMATED COUNT: 14.6 % (ref 12.3–15.4)
HCT VFR BLD AUTO: 30.4 % (ref 34–46.6)
HGB BLD-MCNC: 10 G/DL (ref 12–15.9)
IMM GRANULOCYTES # BLD AUTO: 0.09 10*3/MM3 (ref 0–0.05)
IMM GRANULOCYTES NFR BLD AUTO: 0.8 % (ref 0–0.5)
LYMPHOCYTES # BLD AUTO: 2 10*3/MM3 (ref 0.7–3.1)
LYMPHOCYTES NFR BLD AUTO: 18.4 % (ref 19.6–45.3)
MCH RBC QN AUTO: 29.8 PG (ref 26.6–33)
MCHC RBC AUTO-ENTMCNC: 32.9 G/DL (ref 31.5–35.7)
MCV RBC AUTO: 90.5 FL (ref 79–97)
MONOCYTES # BLD AUTO: 0.84 10*3/MM3 (ref 0.1–0.9)
MONOCYTES NFR BLD AUTO: 7.7 % (ref 5–12)
NEUTROPHILS NFR BLD AUTO: 7.79 10*3/MM3 (ref 1.7–7)
NEUTROPHILS NFR BLD AUTO: 71.9 % (ref 42.7–76)
NRBC BLD AUTO-RTO: 0 /100 WBC (ref 0–0.2)
PLATELET # BLD AUTO: 232 10*3/MM3 (ref 140–450)
PMV BLD AUTO: 10.5 FL (ref 6–12)
RBC # BLD AUTO: 3.36 10*6/MM3 (ref 3.77–5.28)
WBC NRBC COR # BLD AUTO: 10.85 10*3/MM3 (ref 3.4–10.8)

## 2024-11-26 PROCEDURE — 85025 COMPLETE CBC W/AUTO DIFF WBC: CPT | Performed by: OBSTETRICS & GYNECOLOGY

## 2024-11-26 RX ORDER — FERROUS SULFATE 325(65) MG
325 TABLET ORAL
Status: DISCONTINUED | OUTPATIENT
Start: 2024-11-26 | End: 2024-11-27 | Stop reason: HOSPADM

## 2024-11-26 RX ADMIN — DOCUSATE SODIUM 100 MG: 100 CAPSULE, LIQUID FILLED ORAL at 08:42

## 2024-11-26 RX ADMIN — FERROUS SULFATE TAB 325 MG (65 MG ELEMENTAL FE) 325 MG: 325 (65 FE) TAB at 08:42

## 2024-11-26 RX ADMIN — ACETAMINOPHEN 650 MG: 325 TABLET ORAL at 20:57

## 2024-11-26 RX ADMIN — DOCUSATE SODIUM 100 MG: 100 CAPSULE, LIQUID FILLED ORAL at 20:57

## 2024-11-26 RX ADMIN — PRENATAL VITAMINS-IRON FUMARATE 27 MG IRON-FOLIC ACID 0.8 MG TABLET 1 TABLET: at 08:42

## 2024-11-26 RX ADMIN — ACETAMINOPHEN 650 MG: 325 TABLET ORAL at 09:47

## 2024-11-26 RX ADMIN — IBUPROFEN 600 MG: 600 TABLET, FILM COATED ORAL at 22:54

## 2024-11-26 RX ADMIN — IBUPROFEN 600 MG: 600 TABLET, FILM COATED ORAL at 04:59

## 2024-11-26 RX ADMIN — ACETAMINOPHEN 650 MG: 325 TABLET ORAL at 00:49

## 2024-11-26 RX ADMIN — IBUPROFEN 600 MG: 600 TABLET, FILM COATED ORAL at 16:28

## 2024-11-26 NOTE — LACTATION NOTE
11/26/24 1445   Maternal Information   Date of Referral 11/26/24   Person Making Referral   (follow up visit w/patient. SLP @ bedside.)   Maternal Assessment   Nipples   (Hydrogels given to patient as her nipples are sore.)   Milk Expression/Equipment   Breast Pump Type manual pump  (Manual pump given to patient w/size 21mm flange.)

## 2024-11-26 NOTE — LACTATION NOTE
11/26/24 1045   Maternal Information   Date of Referral 11/26/24   Person Making Referral lactation consultant  (Courtesy visit for new delivery. Hx: BF 1st child for 4mos & infant had TT & other latch issues per mom's report.)   Maternal Assessment   Breast Size Issue none   Breast Shape Bilateral:;round   Breast Density Bilateral:;soft   Nipples Bilateral:;short  (Infant having trouble latching due to tight frenulum. Mother states other child had difficulty with same issue.)   Left Nipple Symptoms intact;nontender   Right Nipple Symptoms intact;nontender   Maternal Infant Feeding   Maternal Emotional State receptive;anxious   Infant Positioning   (started off in LFB however infant was both sleepy & fussy as he was recently circumcised. Repositioned to [facility] & infant was able to latch w/S nipple shield & jaw support. Infant had a few deep jaw excursions w/stimulation.)   Signs of Milk Transfer deep jaw excursions noted  (a few noted w/stimulation & lower jaw support.)   Pain with Feeding   (initally mom states latch was painful. Once nipple shield utilized & infant repositioned she was no longer in pain.)   Comfort Measures Before/During Feeding infant position adjusted;latch adjusted;maternal position adjusted;suction broken using finger   Latch Assistance full assistance needed  (mother took over feeding after demonstration given)   Support Person Involvement invited to assist with feeding  (FOB asleep but then woke to help w/feeding)   Milk Expression/Equipment   Breast Pump Type double electric, personal  (Pt has an older Motif Adelina BP at home. I brought her a new Spectra from [employer]. Pt encouraged to pump after feeds until milk is in)

## 2024-11-26 NOTE — PROGRESS NOTES
[facility]  Obstetric Progress Note    Chief Complaint: PPD #1    Subjective     Nieves doing well. Pain is well controlled. Reports light lochia without passage of major clots. Ambulating. Tolerating PO intake. Voiding without difficulty or dysuria. Denies flatus.   Breast feeding. Male infant doing well. Planning circ. Reports good mood.   No further complaints at this time.      Objective     Vital Signs Range for the last 24 hours  Temp:  [97.4 °F (36.3 °C)-99.6 °F (37.6 °C)] 99.6 °F (37.6 °C)   BP: ()/(50-85) 123/58   Heart Rate:  [] 104   Resp:  [14-18] 18                   Intake/Output last 24 hours:      Intake/Output Summary (Last 24 hours) at 11/26/2024 0816  Last data filed at 11/25/2024 1622  Gross per 24 hour   Intake --   Output 1350 ml   Net -1350 ml       Intake/Output this shift:    No intake/output data recorded.    Physical Exam:  General: A&Ox3. No acute distress.    HEENT Normocephalic, atraumatic    Heart RRR   Lungs CTAB, unlabored breathing   Abdomen Soft, non tender, negative for guarding and rebound   Extremities Exam of extremities: peripheral pulses normal, no pedal edema, no clubbing or cyanosis   Fundus Firm, midline, at umbilicus       Laboratory Results:  Lab Results   Component Value Date    WBC 10.85 (H) 11/26/2024    HGB 10.0 (L) 11/26/2024    HCT 30.4 (L) 11/26/2024    MCV 90.5 11/26/2024     11/26/2024    URICACID 5.9 (H) 10/24/2022    AST 29 10/24/2022    ALT 9 10/24/2022    HEPBSAG Non-Reactive 04/30/2024         Assessment  PPD# 1 s/p vaginal delivery  Postpartum anemia    Plan  Routine postpartum care.  VSS.  Serial lochia, fundal height checks appropriate. Continue serially.   PP Hgb ordered. 10.0. Start PO ferrous sulfate   Pain well controled with tylenol and ibuprofen PRN.  Encourage PO hydration, ambulation, IS.  Male infant doing well. Plan circ today  Breastfeeding. Lactation available for consult.     Dispo  Plan for discharge home tomorrow  pending patient and infant status.        This note has been electronically signed.    Dacia Mary DO  08:16 EST  November 26, 2024

## 2024-11-26 NOTE — ANESTHESIA POSTPROCEDURE EVALUATION
Patient: Nieves Erickson    Procedure Summary       Date: 11/25/24 Room / Location:     Anesthesia Start: 0801 Anesthesia Stop: 1627    Procedure: LABOR ANALGESIA Diagnosis:     Scheduled Providers:  Provider: Zbigniew Garzon DO    Anesthesia Type: epidural ASA Status: 2            Anesthesia Type: epidural    Vitals  Vitals Value Taken Time   /63 11/26/24 0920   Temp 98.5 °F (36.9 °C) 11/26/24 0920   Pulse 83 11/26/24 0920   Resp 18 11/26/24 0920   SpO2             Post Anesthesia Care and Evaluation    Patient location during evaluation: bedside  Patient participation: complete - patient participated  Level of consciousness: awake and alert  Pain management: adequate    Airway patency: patent  Anesthetic complications: No anesthetic complications    Cardiovascular status: acceptable  Respiratory status: acceptable  Hydration status: acceptable  Post Neuraxial Block status: Motor and sensory function returned to baseline and No signs or symptoms of PDPH

## 2024-11-27 VITALS
HEIGHT: 62 IN | WEIGHT: 187 LBS | BODY MASS INDEX: 34.41 KG/M2 | SYSTOLIC BLOOD PRESSURE: 107 MMHG | RESPIRATION RATE: 16 BRPM | TEMPERATURE: 98 F | HEART RATE: 73 BPM | DIASTOLIC BLOOD PRESSURE: 60 MMHG

## 2024-11-27 RX ORDER — IBUPROFEN 600 MG/1
600 TABLET, FILM COATED ORAL EVERY 6 HOURS PRN
Qty: 60 TABLET | Refills: 0 | Status: SHIPPED | OUTPATIENT
Start: 2024-11-27

## 2024-11-27 RX ORDER — FERROUS SULFATE 325(65) MG
325 TABLET ORAL
Qty: 30 TABLET | Refills: 0 | Status: SHIPPED | OUTPATIENT
Start: 2024-11-27

## 2024-11-27 RX ORDER — PSEUDOEPHEDRINE HCL 30 MG
100 TABLET ORAL 2 TIMES DAILY
Qty: 60 CAPSULE | Refills: 0 | Status: SHIPPED | OUTPATIENT
Start: 2024-11-27

## 2024-11-27 RX ADMIN — PRENATAL VITAMINS-IRON FUMARATE 27 MG IRON-FOLIC ACID 0.8 MG TABLET 1 TABLET: at 08:40

## 2024-11-27 RX ADMIN — IBUPROFEN 600 MG: 600 TABLET, FILM COATED ORAL at 08:40

## 2024-11-27 RX ADMIN — FERROUS SULFATE TAB 325 MG (65 MG ELEMENTAL FE) 325 MG: 325 (65 FE) TAB at 08:40

## 2024-11-27 RX ADMIN — DOCUSATE SODIUM 100 MG: 100 CAPSULE, LIQUID FILLED ORAL at 08:40

## 2024-11-27 NOTE — DISCHARGE SUMMARY
[facility]  Discharge Summary      Patient: Nieves Erickson            : 1990  MRN: [id number]  CSN: [id number]  Consult:   Consults       No orders found from 10/26/2024 to 2024.               Gestational Age at Discharge:  39w4d, delivered      Admission  Diagnosis: Term pregnancy    Discharge Diagnosis:   Patient Active Problem List   Diagnosis    Spontaneous vaginal delivery    Term pregnancy       Date of Admission: 2024    Date of Discharge:  24    Procedures:             Service:  Obstetrics    Hospital Course:       Pt admitted for  IOL  after uncomplicated pregnancy. She was delivered without complication. She delivered a VMI with Apgars 7/9 via . See note for full detail. Her postpartum course was uncomplicated and was discharged home on PPD 2    Labs:    Lab Results (last 24 hours)       ** No results found for the last 24 hours. **          HOSPITAL LABS:  Lab Results   Component Value Date    WBC 10.85 (H) 2024    HGB 10.0 (L) 2024    HCT 30.4 (L) 2024    MCV 90.5 2024     2024    CREATININE 0.70 10/24/2022    URICACID 5.9 (H) 10/24/2022    AST 29 10/24/2022    ALT 9 10/24/2022     (H) 10/24/2022     Results from last 7 days   Lab Units 24  2309   ABO TYPING  O   RH TYPING  Positive   ANTIBODY SCREEN  Negative       IMAGING        Discharge Medications     Discharge Medications        ASK your doctor about these medications        Instructions Start Date   calcium carbonate 500 MG chewable tablet  Commonly known as: TUMS   1 tablet, Oral, Daily      escitalopram 5 MG tablet  Commonly known as: LEXAPRO   5 mg, Oral, Daily      Prenatal 27-1 27-1 MG tablet tablet   1 tablet, Daily               Allergies: No Known Allergies     Discharge Disposition:  To Home    Discharge Condition:  Stable    Discharge Diet: Regular    Activity at Discharge: pelvic rest,     Follow-up Appointments: 6 weeks        Swati Jacobs  MD  11/27/24  08:38 EST

## 2024-11-27 NOTE — PROGRESS NOTES
[facility]  Obstetric Progress Note    Chief Complaint: PPD 2    Subjective     Feeling well. Pain controlled. nichole diet. +amb/void. Lochia appr  Objective     Vital Signs Range for the last 24 hours  Temp:  [97.9 °F (36.6 °C)-98.5 °F (36.9 °C)] 98 °F (36.7 °C)   BP: (107-139)/(60-68) 107/60   Heart Rate:  [73-83] 73   Resp:  [16-18] 16                   Intake/Output last 24 hours:    No intake or output data in the 24 hours ending 24 0837    Intake/Output this shift:    No intake/output data recorded.    Physical Exam:  General: No acute distress   Heart RRR   Lungs CTAB     Abdomen Soft, non tender, fundus firm   Extremities Exam of extremities: pedal edema tr +       Laboratory Results:  CBC:      Lab 24  0542 24  2309   WBC 10.85* 10.29   HEMOGLOBIN 10.0* 12.2   HEMATOCRIT 30.4* 36.4   PLATELETS 232 302   NEUTROS ABS 7.79*  --    IMMATURE GRANS (ABS) 0.09*  --    LYMPHS ABS 2.00  --    MONOS ABS 0.84  --    EOS ABS 0.09  --    MCV 90.5 87.9          Assessment/Plan: PPD 2 s/p   1.RPPC: Advance care  2. Male infant circ done  3. Dc home  4. breast          Swati Jacobs MD  2024  08:37 EST